# Patient Record
Sex: FEMALE | Race: WHITE | NOT HISPANIC OR LATINO | Employment: OTHER | URBAN - METROPOLITAN AREA
[De-identification: names, ages, dates, MRNs, and addresses within clinical notes are randomized per-mention and may not be internally consistent; named-entity substitution may affect disease eponyms.]

---

## 2017-10-05 ENCOUNTER — HOSPITAL ENCOUNTER (OUTPATIENT)
Facility: HOSPITAL | Age: 67
Setting detail: OBSERVATION
Discharge: NURSING, REHAB OR LONG-TERM CARE | End: 2017-10-06
Attending: INTERNAL MEDICINE | Admitting: INTERNAL MEDICINE

## 2017-10-05 ENCOUNTER — APPOINTMENT (OUTPATIENT)
Dept: GENERAL RADIOLOGY | Facility: HOSPITAL | Age: 67
End: 2017-10-05
Attending: INTERNAL MEDICINE

## 2017-10-05 ENCOUNTER — HOSPITAL ENCOUNTER (OUTPATIENT)
Facility: HOSPITAL | Age: 67
Setting detail: HOSPITAL OUTPATIENT SURGERY
End: 2017-10-05
Attending: INTERNAL MEDICINE | Admitting: INTERNAL MEDICINE

## 2017-10-05 PROBLEM — I50.9 CHF (CONGESTIVE HEART FAILURE): Status: ACTIVE | Noted: 2017-10-05

## 2017-10-05 LAB
ALBUMIN SERPL-MCNC: 3.7 G/DL (ref 3.5–5.2)
ALBUMIN/GLOB SERPL: 1.2 G/DL
ALP SERPL-CCNC: 109 U/L (ref 39–117)
ALT SERPL W P-5'-P-CCNC: 37 U/L (ref 1–33)
ANION GAP SERPL CALCULATED.3IONS-SCNC: 14.4 MMOL/L
AST SERPL-CCNC: 23 U/L (ref 1–32)
BASOPHILS # BLD AUTO: 0.03 10*3/MM3 (ref 0–0.2)
BASOPHILS NFR BLD AUTO: 0.4 % (ref 0–1.5)
BILIRUB SERPL-MCNC: 0.6 MG/DL (ref 0.1–1.2)
BUN BLD-MCNC: 33 MG/DL (ref 8–23)
BUN/CREAT SERPL: 26.6 (ref 7–25)
CALCIUM SPEC-SCNC: 9.6 MG/DL (ref 8.6–10.5)
CHLORIDE SERPL-SCNC: 96 MMOL/L (ref 98–107)
CO2 SERPL-SCNC: 28.6 MMOL/L (ref 22–29)
CREAT BLD-MCNC: 1.24 MG/DL (ref 0.57–1)
DEPRECATED RDW RBC AUTO: 50.4 FL (ref 37–54)
EOSINOPHIL # BLD AUTO: 0.09 10*3/MM3 (ref 0–0.7)
EOSINOPHIL NFR BLD AUTO: 1.2 % (ref 0.3–6.2)
ERYTHROCYTE [DISTWIDTH] IN BLOOD BY AUTOMATED COUNT: 17.5 % (ref 11.7–13)
GFR SERPL CREATININE-BSD FRML MDRD: 43 ML/MIN/1.73
GLOBULIN UR ELPH-MCNC: 3.1 GM/DL
GLUCOSE BLD-MCNC: 107 MG/DL (ref 65–99)
HCT VFR BLD AUTO: 35.7 % (ref 35.6–45.5)
HGB BLD-MCNC: 10.8 G/DL (ref 11.9–15.5)
IMM GRANULOCYTES # BLD: 0 10*3/MM3 (ref 0–0.03)
IMM GRANULOCYTES NFR BLD: 0 % (ref 0–0.5)
INR PPP: 1.42 (ref 0.9–1.1)
LYMPHOCYTES # BLD AUTO: 1.15 10*3/MM3 (ref 0.9–4.8)
LYMPHOCYTES NFR BLD AUTO: 15.9 % (ref 19.6–45.3)
MAGNESIUM SERPL-MCNC: 1.7 MG/DL (ref 1.6–2.4)
MCH RBC QN AUTO: 24.2 PG (ref 26.9–32)
MCHC RBC AUTO-ENTMCNC: 30.3 G/DL (ref 32.4–36.3)
MCV RBC AUTO: 80 FL (ref 80.5–98.2)
MONOCYTES # BLD AUTO: 0.72 10*3/MM3 (ref 0.2–1.2)
MONOCYTES NFR BLD AUTO: 10 % (ref 5–12)
NEUTROPHILS # BLD AUTO: 5.23 10*3/MM3 (ref 1.9–8.1)
NEUTROPHILS NFR BLD AUTO: 72.5 % (ref 42.7–76)
PLATELET # BLD AUTO: 305 10*3/MM3 (ref 140–500)
PMV BLD AUTO: 12 FL (ref 6–12)
POTASSIUM BLD-SCNC: 4.9 MMOL/L (ref 3.5–5.2)
PROT SERPL-MCNC: 6.8 G/DL (ref 6–8.5)
PROTHROMBIN TIME: 16.9 SECONDS (ref 11.7–14.2)
RBC # BLD AUTO: 4.46 10*6/MM3 (ref 3.9–5.2)
SODIUM BLD-SCNC: 139 MMOL/L (ref 136–145)
WBC NRBC COR # BLD: 7.22 10*3/MM3 (ref 4.5–10.7)

## 2017-10-05 PROCEDURE — 25010000002 DIPHENHYDRAMINE PER 50 MG: Performed by: INTERNAL MEDICINE

## 2017-10-05 PROCEDURE — C1900 LEAD, CORONARY VENOUS: HCPCS | Performed by: INTERNAL MEDICINE

## 2017-10-05 PROCEDURE — C1900 LEAD, CORONARY VENOUS: HCPCS

## 2017-10-05 PROCEDURE — C1769 GUIDE WIRE: HCPCS | Performed by: INTERNAL MEDICINE

## 2017-10-05 PROCEDURE — C1730 CATH, EP, 19 OR FEW ELECT: HCPCS | Performed by: INTERNAL MEDICINE

## 2017-10-05 PROCEDURE — 71010 HC CHEST PA OR AP: CPT

## 2017-10-05 PROCEDURE — C1894 INTRO/SHEATH, NON-LASER: HCPCS | Performed by: INTERNAL MEDICINE

## 2017-10-05 PROCEDURE — 93005 ELECTROCARDIOGRAM TRACING: CPT | Performed by: INTERNAL MEDICINE

## 2017-10-05 PROCEDURE — 0 IOPAMIDOL PER 1 ML: Performed by: INTERNAL MEDICINE

## 2017-10-05 PROCEDURE — C1892 INTRO/SHEATH,FIXED,PEEL-AWAY: HCPCS | Performed by: INTERNAL MEDICINE

## 2017-10-05 PROCEDURE — 83735 ASSAY OF MAGNESIUM: CPT | Performed by: INTERNAL MEDICINE

## 2017-10-05 PROCEDURE — 99153 MOD SED SAME PHYS/QHP EA: CPT | Performed by: INTERNAL MEDICINE

## 2017-10-05 PROCEDURE — 25010000002 METHYLPREDNISOLONE PER 125 MG: Performed by: INTERNAL MEDICINE

## 2017-10-05 PROCEDURE — C1882 AICD, OTHER THAN SING/DUAL: HCPCS | Performed by: INTERNAL MEDICINE

## 2017-10-05 PROCEDURE — 33249 INSJ/RPLCMT DEFIB W/LEAD(S): CPT | Performed by: INTERNAL MEDICINE

## 2017-10-05 PROCEDURE — C1882 AICD, OTHER THAN SING/DUAL: HCPCS

## 2017-10-05 PROCEDURE — 80053 COMPREHEN METABOLIC PANEL: CPT | Performed by: INTERNAL MEDICINE

## 2017-10-05 PROCEDURE — C1895 LEAD, AICD, ENDO DUAL COIL: HCPCS | Performed by: INTERNAL MEDICINE

## 2017-10-05 PROCEDURE — G0378 HOSPITAL OBSERVATION PER HR: HCPCS

## 2017-10-05 PROCEDURE — 25010000002 FENTANYL CITRATE (PF) 100 MCG/2ML SOLUTION: Performed by: INTERNAL MEDICINE

## 2017-10-05 PROCEDURE — C1898 LEAD, PMKR, OTHER THAN TRANS: HCPCS | Performed by: INTERNAL MEDICINE

## 2017-10-05 PROCEDURE — 99152 MOD SED SAME PHYS/QHP 5/>YRS: CPT | Performed by: INTERNAL MEDICINE

## 2017-10-05 PROCEDURE — 25010000002 VANCOMYCIN PER 500 MG: Performed by: INTERNAL MEDICINE

## 2017-10-05 PROCEDURE — 85610 PROTHROMBIN TIME: CPT | Performed by: INTERNAL MEDICINE

## 2017-10-05 PROCEDURE — 33225 L VENTRIC PACING LEAD ADD-ON: CPT | Performed by: INTERNAL MEDICINE

## 2017-10-05 PROCEDURE — C1898 LEAD, PMKR, OTHER THAN TRANS: HCPCS

## 2017-10-05 PROCEDURE — 25010000002 MIDAZOLAM PER 1 MG: Performed by: INTERNAL MEDICINE

## 2017-10-05 PROCEDURE — 85025 COMPLETE CBC W/AUTO DIFF WBC: CPT | Performed by: INTERNAL MEDICINE

## 2017-10-05 DEVICE — LD QUARTET LV S/CRV BIPOL 1458Q/86: Type: IMPLANTABLE DEVICE | Status: FUNCTIONAL

## 2017-10-05 DEVICE — LD PM MRI TENDRIL LPA1200M52: Type: IMPLANTABLE DEVICE | Status: FUNCTIONAL

## 2017-10-05 DEVICE — LD DEFIB OPTISURE DF4 1COIL 8F 65CM: Type: IMPLANTABLE DEVICE | Status: FUNCTIONAL

## 2017-10-05 DEVICE — IMPLANTABLE DEVICE: Type: IMPLANTABLE DEVICE | Status: FUNCTIONAL

## 2017-10-05 RX ORDER — VANCOMYCIN HYDROCHLORIDE 1 G/200ML
INJECTION, SOLUTION INTRAVENOUS CONTINUOUS PRN
Status: DISCONTINUED | OUTPATIENT
Start: 2017-10-05 | End: 2017-10-05 | Stop reason: HOSPADM

## 2017-10-05 RX ORDER — LIDOCAINE HYDROCHLORIDE 10 MG/ML
INJECTION, SOLUTION INFILTRATION; PERINEURAL AS NEEDED
Status: DISCONTINUED | OUTPATIENT
Start: 2017-10-05 | End: 2017-10-05 | Stop reason: HOSPADM

## 2017-10-05 RX ORDER — VANCOMYCIN HYDROCHLORIDE 1 G/200ML
1000 INJECTION, SOLUTION INTRAVENOUS ONCE
Status: COMPLETED | OUTPATIENT
Start: 2017-10-06 | End: 2017-10-06

## 2017-10-05 RX ORDER — METHYLPREDNISOLONE SODIUM SUCCINATE 125 MG/2ML
INJECTION, POWDER, LYOPHILIZED, FOR SOLUTION INTRAMUSCULAR; INTRAVENOUS AS NEEDED
Status: DISCONTINUED | OUTPATIENT
Start: 2017-10-05 | End: 2017-10-05 | Stop reason: HOSPADM

## 2017-10-05 RX ORDER — SODIUM CHLORIDE 9 MG/ML
INJECTION, SOLUTION INTRAVENOUS CONTINUOUS PRN
Status: DISCONTINUED | OUTPATIENT
Start: 2017-10-05 | End: 2017-10-05 | Stop reason: HOSPADM

## 2017-10-05 RX ORDER — FLUOXETINE HYDROCHLORIDE 20 MG/1
40 CAPSULE ORAL DAILY
COMMUNITY

## 2017-10-05 RX ORDER — LEVOTHYROXINE SODIUM 0.15 MG/1
150 TABLET ORAL DAILY
Status: DISCONTINUED | OUTPATIENT
Start: 2017-10-06 | End: 2017-10-05 | Stop reason: SDUPTHER

## 2017-10-05 RX ORDER — FAMOTIDINE 20 MG/1
20 TABLET, FILM COATED ORAL 2 TIMES DAILY
Status: DISCONTINUED | OUTPATIENT
Start: 2017-10-05 | End: 2017-10-06 | Stop reason: HOSPADM

## 2017-10-05 RX ORDER — OMEPRAZOLE 20 MG/1
20 CAPSULE, DELAYED RELEASE ORAL DAILY
COMMUNITY
End: 2018-07-04 | Stop reason: HOSPADM

## 2017-10-05 RX ORDER — FLUOXETINE HYDROCHLORIDE 20 MG/1
40 CAPSULE ORAL DAILY
Status: DISCONTINUED | OUTPATIENT
Start: 2017-10-05 | End: 2017-10-06 | Stop reason: HOSPADM

## 2017-10-05 RX ORDER — TORSEMIDE 20 MG/1
40 TABLET ORAL DAILY
Status: DISCONTINUED | OUTPATIENT
Start: 2017-10-05 | End: 2017-10-06 | Stop reason: HOSPADM

## 2017-10-05 RX ORDER — HYDROCODONE BITARTRATE AND ACETAMINOPHEN 5; 325 MG/1; MG/1
1 TABLET ORAL EVERY 4 HOURS PRN
Status: DISCONTINUED | OUTPATIENT
Start: 2017-10-05 | End: 2017-10-09 | Stop reason: HOSPADM

## 2017-10-05 RX ORDER — CETIRIZINE HYDROCHLORIDE 10 MG/1
10 TABLET ORAL DAILY
COMMUNITY

## 2017-10-05 RX ORDER — LIOTHYRONINE SODIUM 25 UG/1
25 TABLET ORAL DAILY
Status: DISCONTINUED | OUTPATIENT
Start: 2017-10-05 | End: 2017-10-06 | Stop reason: HOSPADM

## 2017-10-05 RX ORDER — POTASSIUM CHLORIDE 750 MG/1
20 CAPSULE, EXTENDED RELEASE ORAL 2 TIMES DAILY
COMMUNITY

## 2017-10-05 RX ORDER — CETIRIZINE HYDROCHLORIDE 10 MG/1
10 TABLET ORAL DAILY
Status: DISCONTINUED | OUTPATIENT
Start: 2017-10-06 | End: 2017-10-06

## 2017-10-05 RX ORDER — FENTANYL CITRATE 50 UG/ML
INJECTION, SOLUTION INTRAMUSCULAR; INTRAVENOUS AS NEEDED
Status: DISCONTINUED | OUTPATIENT
Start: 2017-10-05 | End: 2017-10-05 | Stop reason: HOSPADM

## 2017-10-05 RX ORDER — CETIRIZINE HYDROCHLORIDE 10 MG/1
10 TABLET ORAL DAILY
Status: DISCONTINUED | OUTPATIENT
Start: 2017-10-06 | End: 2017-10-06 | Stop reason: HOSPADM

## 2017-10-05 RX ORDER — DOXEPIN HYDROCHLORIDE 25 MG/1
50 CAPSULE ORAL NIGHTLY PRN
COMMUNITY

## 2017-10-05 RX ORDER — NITROGLYCERIN 0.4 MG/1
0.4 TABLET SUBLINGUAL
COMMUNITY

## 2017-10-05 RX ORDER — ZOLPIDEM TARTRATE 5 MG/1
5 TABLET ORAL NIGHTLY PRN
Status: DISCONTINUED | OUTPATIENT
Start: 2017-10-05 | End: 2017-10-06 | Stop reason: HOSPADM

## 2017-10-05 RX ORDER — ACETAMINOPHEN 325 MG/1
650 TABLET ORAL EVERY 4 HOURS PRN
Status: DISCONTINUED | OUTPATIENT
Start: 2017-10-05 | End: 2017-10-09 | Stop reason: HOSPADM

## 2017-10-05 RX ORDER — ASPIRIN 325 MG
325 TABLET ORAL DAILY
Status: DISCONTINUED | OUTPATIENT
Start: 2017-10-06 | End: 2017-10-05 | Stop reason: SDUPTHER

## 2017-10-05 RX ORDER — TORSEMIDE 20 MG/1
40 TABLET ORAL DAILY
Status: ON HOLD | COMMUNITY
End: 2018-07-04

## 2017-10-05 RX ORDER — SODIUM CHLORIDE 0.9 % (FLUSH) 0.9 %
1-10 SYRINGE (ML) INJECTION AS NEEDED
Status: DISCONTINUED | OUTPATIENT
Start: 2017-10-05 | End: 2017-10-06 | Stop reason: HOSPADM

## 2017-10-05 RX ORDER — TORSEMIDE 20 MG/1
40 TABLET ORAL DAILY
Status: DISCONTINUED | OUTPATIENT
Start: 2017-10-06 | End: 2017-10-05 | Stop reason: SDUPTHER

## 2017-10-05 RX ORDER — MIDAZOLAM HYDROCHLORIDE 1 MG/ML
INJECTION INTRAMUSCULAR; INTRAVENOUS AS NEEDED
Status: DISCONTINUED | OUTPATIENT
Start: 2017-10-05 | End: 2017-10-05 | Stop reason: HOSPADM

## 2017-10-05 RX ORDER — FLUOXETINE HYDROCHLORIDE 20 MG/1
40 CAPSULE ORAL DAILY
Status: DISCONTINUED | OUTPATIENT
Start: 2017-10-06 | End: 2017-10-05 | Stop reason: SDUPTHER

## 2017-10-05 RX ORDER — LEVOTHYROXINE SODIUM 0.15 MG/1
150 TABLET ORAL
Status: DISCONTINUED | OUTPATIENT
Start: 2017-10-06 | End: 2017-10-06 | Stop reason: HOSPADM

## 2017-10-05 RX ORDER — DIPHENHYDRAMINE HYDROCHLORIDE 50 MG/ML
INJECTION INTRAMUSCULAR; INTRAVENOUS AS NEEDED
Status: DISCONTINUED | OUTPATIENT
Start: 2017-10-05 | End: 2017-10-05 | Stop reason: HOSPADM

## 2017-10-05 RX ORDER — ONDANSETRON 2 MG/ML
4 INJECTION INTRAMUSCULAR; INTRAVENOUS EVERY 6 HOURS PRN
Status: DISCONTINUED | OUTPATIENT
Start: 2017-10-05 | End: 2017-10-06 | Stop reason: HOSPADM

## 2017-10-05 RX ORDER — LIOTHYRONINE SODIUM 25 UG/1
25 TABLET ORAL DAILY
COMMUNITY
End: 2022-07-19

## 2017-10-05 RX ORDER — LEVOTHYROXINE SODIUM 0.15 MG/1
150 TABLET ORAL DAILY
COMMUNITY

## 2017-10-05 RX ORDER — OMEPRAZOLE 20 MG/1
20 CAPSULE, DELAYED RELEASE ORAL
Status: DISCONTINUED | OUTPATIENT
Start: 2017-10-06 | End: 2017-10-06 | Stop reason: HOSPADM

## 2017-10-05 RX ORDER — ASPIRIN 325 MG
325 TABLET ORAL DAILY
COMMUNITY

## 2017-10-05 RX ORDER — DOXEPIN HYDROCHLORIDE 50 MG/1
50 CAPSULE ORAL NIGHTLY PRN
Status: DISCONTINUED | OUTPATIENT
Start: 2017-10-05 | End: 2017-10-06 | Stop reason: HOSPADM

## 2017-10-05 RX ORDER — POTASSIUM CHLORIDE 750 MG/1
20 CAPSULE, EXTENDED RELEASE ORAL 2 TIMES DAILY WITH MEALS
Status: DISCONTINUED | OUTPATIENT
Start: 2017-10-05 | End: 2017-10-06 | Stop reason: HOSPADM

## 2017-10-05 RX ORDER — PANTOPRAZOLE SODIUM 40 MG/1
40 TABLET, DELAYED RELEASE ORAL EVERY MORNING
Status: DISCONTINUED | OUTPATIENT
Start: 2017-10-06 | End: 2017-10-06 | Stop reason: HOSPADM

## 2017-10-05 RX ORDER — ASPIRIN 325 MG
325 TABLET ORAL DAILY
Status: DISCONTINUED | OUTPATIENT
Start: 2017-10-05 | End: 2017-10-06 | Stop reason: HOSPADM

## 2017-10-05 RX ORDER — NITROGLYCERIN 0.4 MG/1
0.4 TABLET SUBLINGUAL
Status: DISCONTINUED | OUTPATIENT
Start: 2017-10-05 | End: 2017-10-06 | Stop reason: HOSPADM

## 2017-10-05 RX ORDER — DOXEPIN HYDROCHLORIDE 50 MG/1
50 CAPSULE ORAL NIGHTLY PRN
Status: DISCONTINUED | OUTPATIENT
Start: 2017-10-05 | End: 2017-10-05 | Stop reason: SDUPTHER

## 2017-10-05 RX ORDER — LIOTHYRONINE SODIUM 25 UG/1
25 TABLET ORAL DAILY
Status: DISCONTINUED | OUTPATIENT
Start: 2017-10-06 | End: 2017-10-05 | Stop reason: SDUPTHER

## 2017-10-05 RX ORDER — ONDANSETRON 2 MG/ML
4 INJECTION INTRAMUSCULAR; INTRAVENOUS EVERY 6 HOURS PRN
Status: DISCONTINUED | OUTPATIENT
Start: 2017-10-05 | End: 2017-10-09 | Stop reason: HOSPADM

## 2017-10-05 RX ORDER — POTASSIUM CHLORIDE 750 MG/1
20 CAPSULE, EXTENDED RELEASE ORAL 2 TIMES DAILY
Status: DISCONTINUED | OUTPATIENT
Start: 2017-10-05 | End: 2017-10-05 | Stop reason: SDUPTHER

## 2017-10-05 NOTE — PLAN OF CARE
Problem: Patient Care Overview (Adult)  Goal: Plan of Care Review  Outcome: Ongoing (interventions implemented as appropriate)    10/05/17 1641   Coping/Psychosocial Response Interventions   Plan Of Care Reviewed With patient   Patient Care Overview   Progress no change   Outcome Evaluation   Outcome Summary/Follow up Plan Patient to have ICD implanted, denies complaints. Plan to have this done tonight. Will continue to monitor.        Goal: Adult Individualization and Mutuality  Outcome: Ongoing (interventions implemented as appropriate)  Goal: Discharge Needs Assessment  Outcome: Ongoing (interventions implemented as appropriate)    Problem: Cardiac: Heart Failure (Adult)  Goal: Signs and Symptoms of Listed Potential Problems Will be Absent or Manageable (Cardiac: Heart Failure)  Outcome: Ongoing (interventions implemented as appropriate)

## 2017-10-05 NOTE — H&P
Patient Care Team:  No Known Provider as PCP - General    Electrophysiology Admission Note    Chief complaint:  SOB, Palpitations - NIDCM, NSVT    Subjective     Temi Ho is a very pleasant  67 y.o. female whom I had originally encounted at [facility] secondary to ADHF as well as NSVT. She has a longstanding history of NIDCM - diagnosed two years ago - a cardiac catherization showed no obstructive disease. She was started on medical management. Despite OPT, continued to have severely diminished LV Function. An ECHO from Spring of 2017 showed an EF of 14%. At that time, an ICD was discussed with her - she refused it at the time as her  had just passed, and she was upset. More recently, she had worsening SOB/DUNAWAY - barely able to do her ADLs presented to [facility] in [facility] - diuresed. Symptoms improved. She had episodes of NSVT. An ECG demonstrated SR with LBBB with a QRS width of > 150 ms. We discussed various options including consideration of a BiV/IVD which she wanted to proceed with. She was transferred to [facility] for implantation of a Device.     Review of Systems   Negative except as stated above.    History  Past Medical History:   Diagnosis Date   • Arthritis    • Atrial fibrillation    • BBB (bundle branch block)    • CHF (congestive heart failure)    • COPD (chronic obstructive pulmonary disease)    • Diabetes mellitus    • Disease of thyroid gland    • GERD (gastroesophageal reflux disease)    • Hyperlipidemia    • Hypertension    • IBS (irritable bowel syndrome)    • Obesity      Past Surgical History:   Procedure Laterality Date   • BILATERAL OOPHORECTOMY     • HYSTERECTOMY     • JOINT REPLACEMENT      bilateral knees     Family History   Problem Relation Age of Onset   • Heart disease Mother    • Cancer Mother    • Hypertension Mother    • Heart disease Father    • Hypertension Father    • Aortic aneurysm Sister      Social History   Substance Use Topics   • Smoking  status: Former Smoker     Packs/day: 2.50     Years: 45.00     Types: Cigarettes     Start date: 1962     Quit date: 2007   • Smokeless tobacco: Never Used   • Alcohol use Yes      Comment: rarely     Prescriptions Prior to Admission   Medication Sig Dispense Refill Last Dose   • aspirin 325 MG tablet Take 325 mg by mouth Daily.   10/4/2017 at Unknown time   • cetirizine (zyrTEC) 10 MG tablet Take 10 mg by mouth Daily.   10/4/2017 at Unknown time   • doxepin (SINEquan) 25 MG capsule Take 50 mg by mouth At Night As Needed for Sleep.   Past Week at Unknown time   • FLUoxetine (PROzac) 20 MG capsule Take 40 mg by mouth Daily.   10/4/2017 at Unknown time   • levothyroxine (SYNTHROID, LEVOTHROID) 150 MCG tablet Take 150 mcg by mouth Daily.   10/4/2017 at Unknown time   • liothyronine (CYTOMEL) 25 MCG tablet Take 25 mcg by mouth Daily.   10/4/2017 at Unknown time   • nitroglycerin (NITROSTAT) 0.4 MG SL tablet Place 0.4 mg under the tongue Every 5 (Five) Minutes As Needed for Chest Pain. Take no more than 3 doses in 15 minutes.   Past Month at Unknown time   • omeprazole (priLOSEC) 20 MG capsule Take 20 mg by mouth Daily.   10/4/2017 at Unknown time   • potassium chloride (MICRO-K) 10 MEQ CR capsule Take 20 mEq by mouth 2 (Two) Times a Day.   10/4/2017 at Unknown time   • torsemide (DEMADEX) 20 MG tablet Take 40 mg by mouth Daily.   Past Week at Unknown time     Allergies:  Ace inhibitors; Coreg [carvedilol]; Iodine; Metoprolol; Spironolactone; Statins; Aleve [naproxen sodium]; Amoxicillin; Latex; and Tylenol [acetaminophen]  Social History     Social History   • Marital status:      Spouse name: N/A   • Number of children: N/A   • Years of education: N/A     Occupational History   • Not on file.     Social History Main Topics   • Smoking status: Former Smoker     Packs/day: 2.50     Years: 45.00     Types: Cigarettes     Start date: 1962     Quit date: 2007   • Smokeless tobacco: Never Used   • Alcohol use Yes       Comment: rarely   • Drug use: No      Comment: used cannabis oil as bronchodilator for a few days a couple weeks ago, but nothing since.   • Sexual activity: Defer     Other Topics Concern   • Not on file     Social History Narrative   • No narrative on file      Family History   Problem Relation Age of Onset   • Heart disease Mother    • Cancer Mother    • Hypertension Mother    • Heart disease Father    • Hypertension Father    • Aortic aneurysm Sister         Objective     Vital Signs  Temp:  [97.6 °F (36.4 °C)-98.1 °F (36.7 °C)] 98.1 °F (36.7 °C)  Heart Rate:  [] 89  Resp:  [16-18] 16  BP: (101-107)/(75-77) 107/75    Physical Exam:      General Appearance:    Alert, cooperative, in no acute distress   Head:    Normocephalic, without obvious abnormality, atraumatic   Eyes:            Lids and lashes normal, conjunctivae and sclerae normal, no   icterus, no pallor, corneas clear, PERRLA   Ears:    Ears appear intact with no abnormalities noted   Throat:   No oral lesions, no thrush, oral mucosa moist   Neck:   No adenopathy, supple, trachea midline, no thyromegaly, no     carotid bruit, no JVD   Back:     No kyphosis present, no scoliosis present, no skin lesions,       erythema or scars, no tenderness to percussion or                   palpation,   range of motion normal   Lungs:     Clear to auscultation,respirations regular, even and                   unlabored    Heart:    Regular rhythm and normal rate, normal S1 and S2, no            murmur, no gallop, no rub, no click   Breast Exam:    Deferred   Abdomen:     Normal bowel sounds, no masses, no organomegaly, soft        non-tender, non-distended, no guarding, no rebound                 tenderness   Genitalia:    Deferred   Extremities:   Moves all extremities well, no edema, no cyanosis, no              redness   Pulses:   Pulses palpable and equal bilaterally   Skin:   No bleeding, bruising or rash   Lymph nodes:   No palpable adenopathy   Neurologic:    Cranial nerves 2 - 12 grossly intact, sensation intact, DTR        present and equal bilaterally       Results Review:    I reviewed the patient's new clinical results.  I reviewed the patient's new imaging results and agree with the interpretation.  I personally viewed and interpreted the patient's EKG/Telemetry data  Results from last 7 days  Lab Units 10/05/17  1205   POTASSIUM mmol/L 4.9   CREATININE mg/dL 1.24*   BILIRUBIN mg/dL 0.6   MAGNESIUM mg/dL 1.7         Results from last 7 days  Lab Units 10/05/17  1205   WBC 10*3/mm3 7.22   HEMOGLOBIN g/dL 10.8*   HEMATOCRIT % 35.7   PLATELETS 10*3/mm3 305     No results found for: TROPONINT  No results found for: CHOL  No results found for: HDL  No results found for: LDL  No results found for: TRIG  No components found for: CHOLHDL  No results found for: TSH  Lab Results   Component Value Date    INR 1.42 (H) 10/05/2017        Echo EF Estimated  No results found for: ECHOEFEST      Assessment/Plan     Active Problems:    * No active hospital problems. *      A/P: 66 yo female with longstanding NIDCM with NSVT.    1. NIDCM - HFrEF - longstanding > 9 mos - despite OPT - continues to have severely depressed LV Function with NYHA Class III symptoms - meets Class I indications for ICD for primary prophylaxis based on SCD-HeFT criteria. Also has LBBB with wide QRS width > 150 ms - meets Class I indications for CRTD device. Plan on implantation of BiV/ICD today. Intolerant of BB and ACE/ARB - very limited on medical management. On diuretics - euvolemic currently.    Informed Consent Obtained.    2. HTN - normotensive on current regimen.    3. DM - hold metformin with planned surgery - SSI.    4. GERD - PPI.    I discussed the patients findings and my recommendations with patient and nursing staff.     Yash Watts MD  10/05/17  5:58 PM    Time: > 75 minutes    EMR Dragon/Transcription disclaimer:   Much of this encounter note is an electronic  transcription/translation of spoken language to printed text. The electronic translation of spoken language may permit erroneous, or at times, nonsensical words or phrases to be inadvertently transcribed.  Although I have reviewed the note for such errors, some may still exist. Please contact me if needed for clarification or correction.

## 2017-10-06 ENCOUNTER — APPOINTMENT (OUTPATIENT)
Dept: GENERAL RADIOLOGY | Facility: HOSPITAL | Age: 67
End: 2017-10-06
Attending: INTERNAL MEDICINE

## 2017-10-06 VITALS
DIASTOLIC BLOOD PRESSURE: 66 MMHG | OXYGEN SATURATION: 98 % | SYSTOLIC BLOOD PRESSURE: 102 MMHG | HEART RATE: 108 BPM | RESPIRATION RATE: 16 BRPM | TEMPERATURE: 97.5 F | BODY MASS INDEX: 38.55 KG/M2 | HEIGHT: 61 IN | WEIGHT: 204.2 LBS

## 2017-10-06 PROBLEM — I42.8 NONISCHEMIC CARDIOMYOPATHY (HCC): Chronic | Status: ACTIVE | Noted: 2017-10-06

## 2017-10-06 LAB
ANION GAP SERPL CALCULATED.3IONS-SCNC: 13.3 MMOL/L
BUN BLD-MCNC: 35 MG/DL (ref 8–23)
BUN/CREAT SERPL: 25.9 (ref 7–25)
CALCIUM SPEC-SCNC: 9.2 MG/DL (ref 8.6–10.5)
CHLORIDE SERPL-SCNC: 97 MMOL/L (ref 98–107)
CO2 SERPL-SCNC: 28.7 MMOL/L (ref 22–29)
CREAT BLD-MCNC: 1.35 MG/DL (ref 0.57–1)
DEPRECATED RDW RBC AUTO: 49.9 FL (ref 37–54)
ERYTHROCYTE [DISTWIDTH] IN BLOOD BY AUTOMATED COUNT: 17.3 % (ref 11.7–13)
GFR SERPL CREATININE-BSD FRML MDRD: 39 ML/MIN/1.73
GLUCOSE BLD-MCNC: 208 MG/DL (ref 65–99)
HCT VFR BLD AUTO: 34.5 % (ref 35.6–45.5)
HGB BLD-MCNC: 10.2 G/DL (ref 11.9–15.5)
INR PPP: 1.36 (ref 0.9–1.1)
MCH RBC QN AUTO: 23.7 PG (ref 26.9–32)
MCHC RBC AUTO-ENTMCNC: 29.6 G/DL (ref 32.4–36.3)
MCV RBC AUTO: 80 FL (ref 80.5–98.2)
PLATELET # BLD AUTO: 257 10*3/MM3 (ref 140–500)
PMV BLD AUTO: 11.7 FL (ref 6–12)
POTASSIUM BLD-SCNC: 4.1 MMOL/L (ref 3.5–5.2)
PROTHROMBIN TIME: 16.3 SECONDS (ref 11.7–14.2)
RBC # BLD AUTO: 4.31 10*6/MM3 (ref 3.9–5.2)
SODIUM BLD-SCNC: 139 MMOL/L (ref 136–145)
WBC NRBC COR # BLD: 6.02 10*3/MM3 (ref 4.5–10.7)

## 2017-10-06 PROCEDURE — 93005 ELECTROCARDIOGRAM TRACING: CPT | Performed by: INTERNAL MEDICINE

## 2017-10-06 PROCEDURE — 93010 ELECTROCARDIOGRAM REPORT: CPT | Performed by: INTERNAL MEDICINE

## 2017-10-06 PROCEDURE — G0008 ADMIN INFLUENZA VIRUS VAC: HCPCS | Performed by: INTERNAL MEDICINE

## 2017-10-06 PROCEDURE — 85027 COMPLETE CBC AUTOMATED: CPT | Performed by: INTERNAL MEDICINE

## 2017-10-06 PROCEDURE — G0378 HOSPITAL OBSERVATION PER HR: HCPCS

## 2017-10-06 PROCEDURE — 90661 CCIIV3 VAC ABX FR 0.5 ML IM: CPT | Performed by: INTERNAL MEDICINE

## 2017-10-06 PROCEDURE — 25010000002 VANCOMYCIN PER 500 MG: Performed by: INTERNAL MEDICINE

## 2017-10-06 PROCEDURE — 25010000002 INFLUENZA VAC SUBUNIT QUAD 0.5 ML SUSPENSION PREFILLED SYRINGE: Performed by: INTERNAL MEDICINE

## 2017-10-06 PROCEDURE — 85610 PROTHROMBIN TIME: CPT | Performed by: INTERNAL MEDICINE

## 2017-10-06 PROCEDURE — 71020 HC CHEST PA AND LATERAL: CPT

## 2017-10-06 PROCEDURE — 80048 BASIC METABOLIC PNL TOTAL CA: CPT | Performed by: INTERNAL MEDICINE

## 2017-10-06 RX ORDER — LEVOFLOXACIN 750 MG/1
750 TABLET ORAL DAILY
Qty: 20 TABLET | Refills: 0
Start: 2017-10-06 | End: 2017-10-13

## 2017-10-06 RX ADMIN — PANTOPRAZOLE SODIUM 40 MG: 40 TABLET, DELAYED RELEASE ORAL at 09:11

## 2017-10-06 RX ADMIN — ASPIRIN 325 MG: 325 TABLET ORAL at 09:11

## 2017-10-06 RX ADMIN — LIOTHYRONINE SODIUM 25 MCG: 25 TABLET ORAL at 09:11

## 2017-10-06 RX ADMIN — CETIRIZINE HYDROCHLORIDE 10 MG: 10 TABLET, FILM COATED ORAL at 09:09

## 2017-10-06 RX ADMIN — POTASSIUM CHLORIDE 20 MEQ: 750 CAPSULE, EXTENDED RELEASE ORAL at 09:11

## 2017-10-06 RX ADMIN — FAMOTIDINE 20 MG: 20 TABLET, FILM COATED ORAL at 09:09

## 2017-10-06 RX ADMIN — VANCOMYCIN HYDROCHLORIDE 1000 MG: 1 INJECTION, SOLUTION INTRAVENOUS at 06:45

## 2017-10-06 RX ADMIN — A/SINGAPORE/GP1908/2015 IVR-180 (H1N1) (AN A/MICHIGAN/45/2015-LIKE VIRUS), A/SINGAPORE/GP2050/2015 (H3N2) (AN A/HONG KONG/4801/2014 - LIKE VIRUS), B/UTAH/9/2014 (A B/PHUKET/3073/2013-LIKE VIRUS), B/HONG KONG/259/2010 (A B/BRISBANE/60/08-LIKE VIRUS) 0.5 ML: 15; 15; 15; 15 INJECTION, SUSPENSION INTRAMUSCULAR at 11:03

## 2017-10-06 RX ADMIN — TORSEMIDE 40 MG: 20 TABLET ORAL at 09:09

## 2017-10-06 RX ADMIN — LEVOTHYROXINE SODIUM 150 MCG: 150 TABLET ORAL at 07:09

## 2017-10-06 RX ADMIN — FLUOXETINE HYDROCHLORIDE 40 MG: 20 CAPSULE ORAL at 09:11

## 2017-10-06 NOTE — DISCHARGE SUMMARY
Date of Discharge:  10/6/2017    Discharge Diagnosis: Severe ischemic cardiomyopathy, intolerance to usual medical therapy for the cardiomyopathy    Presenting Problem/History of Present Illness  CHF (congestive heart failure) [I50.9]  CHF (congestive heart failure) [I50.9]     Patient Active Problem List   Diagnosis   • CHF (congestive heart failure)            Hospital Course  Patient is a 67 y.o. female presented with a severe nonischemic cardiomyopathy.  She was in [facility] over the weekend.  She was admitted there with some  decompensated systolic/diastolic heart failure.  He has a known severe nonischemic cardiac myopathy.  The patient is allergic to beta blockers and had angioedema with Ace inhibitors.  Therefore she we cannot use any beta blocker or Ace or ARB or Entresto.  Over the weekend she was started on a minimal dose of spironolactone at 12.5 mg.  She gave a previous history of an allergy to that but that was not clear.  Someone stopped the spironolactone so I assume she may have had some difficulty with that.  The patient has a left bundle branch block with a wide QRS.  Due to the inability to prescribe any significant medical therapy for her cardiomyopathy was transferred here and yesterday had a biventricular ICD implanted I Dr. Watts.  On the day of discharge she appears euvolemic and has no shortness of air.  She says she generally feels well.  I have reviewed the chest x-ray and the pacemaker looks okay.  I don't have the official radiology report yet and if something changes on that unless the nurse to notify me.  Lungs look clear to me on the chest x-ray with no congestion and no pneumothorax.  The implantation site looks and feels good.  Will be discharged on her prior medicines and Dr. Watts wants her to have Levaquin 750 mg daily for 7 days.  She has some mild elevation of her creatinine.  When she sees him next Wednesday she should have a BMP drawn to check the  renal function and the potassium.  Her sinus heart rate remains elevated as it was in the last several days.  Heart rate now is 94.  If it is still elevated when she seen in the office next Wednesday she may be tried on low-dose Colanor.  Procedures Performed  Procedure(s):  Implant ICD - bi ventricular       Consults:   Consults     No orders found for last 30 day(s).          Pertinent Test Results: Pacemaker insertion with ICD    Ejection Fraction  Previous LVEF in the 15-20% range    Condition on Discharge:  Stable    Physical Exam at Discharge    Vital Signs  Temp:  [97.4 °F (36.3 °C)-98.2 °F (36.8 °C)] 97.4 °F (36.3 °C)  Heart Rate:  [] 100  Resp:  [15-18] 16  BP: ()/() 102/66  Wt Readings from Last 4 Encounters:   10/05/17 204 lb 3.2 oz (92.6 kg)      General Appearance:    Alert, cooperative, in no acute distress   Head:    Normocephalic, without obvious abnormality, atraumatic   Eyes:            Conjunctivae normal, no   icterus   Neck:   No adenopathy, supple, trachea midline, no thyromegaly, no   carotid bruit, no JVD   Lungs:     Clear to auscultation,respirations regular, even and                  unlabored    Heart:     Regular rhythm and normal rate, normal S1 and S2,            No murmur, no gallop, no rub, no click   Chest Wall:    No abnormalities observed   Abdomen:     Normal bowel sounds, no masses, no organomegaly, soft        non-tender, non-distended, no guarding, no rebound                tenderness   Rectal:     Deferred   Extremities:   No edema. Moves all extremities well, no cyanosis, no erythema       Skin:   No bleeding, bruising or rash   Neurologic:   Cranial nerves 2 - 12 grossly intact, sensation intact, DTR       present and equal bilaterally          Discharge DispositionShe is going to a skilled nursing unit for some rehabilitation      Discharge Medications   Temi Ho   Home Medication Instructions IVAN:[id number]    Printed on:10/06/17 0949    Medication Information                      aspirin 325 MG tablet  Take 325 mg by mouth Daily.             cetirizine (zyrTEC) 10 MG tablet  Take 10 mg by mouth Daily.             doxepin (SINEquan) 25 MG capsule  Take 50 mg by mouth At Night As Needed for Sleep.             FLUoxetine (PROzac) 20 MG capsule  Take 40 mg by mouth Daily.             levothyroxine (SYNTHROID, LEVOTHROID) 150 MCG tablet  Take 150 mcg by mouth Daily.             liothyronine (CYTOMEL) 25 MCG tablet  Take 25 mcg by mouth Daily.             nitroglycerin (NITROSTAT) 0.4 MG SL tablet  Place 0.4 mg under the tongue Every 5 (Five) Minutes As Needed for Chest Pain. Take no more than 3 doses in 15 minutes.             omeprazole (priLOSEC) 20 MG capsule  Take 20 mg by mouth Daily.             potassium chloride (MICRO-K) 10 MEQ CR capsule  Take 20 mEq by mouth 2 (Two) Times a Day.             torsemide (DEMADEX) 20 MG tablet  Take 40 mg by mouth Daily.                 Discharge Diet:  cardiac low-sodium    Activity at Discharge:  her general activity is limited but she can gradually resume whatever activity she was doing preadmission  She has been told to not lift anything with her left arm and did not raise her left arm over her shoulder.    Follow-up Appointments  See Dr. Watts in our [place] office on 10/11/17.  She needs a BMP drawn at that time.      Test Results at Discharge  Lab Results   Component Value Date    GLUCOSE 208 (H) 10/06/2017    CALCIUM 9.2 10/06/2017     10/06/2017    K 4.1 10/06/2017    CO2 28.7 10/06/2017    CL 97 (L) 10/06/2017    BUN 35 (H) 10/06/2017    CREATININE 1.35 (H) 10/06/2017    EGFRIFNONA 39 (L) 10/06/2017    BCR 25.9 (H) 10/06/2017    ANIONGAP 13.3 10/06/2017        Lab Results   Component Value Date    GLUCOSE 208 (H) 10/06/2017    BUN 35 (H) 10/06/2017    CREATININE 1.35 (H) 10/06/2017    EGFRIFNONA 39 (L) 10/06/2017    BCR 25.9 (H) 10/06/2017    CO2 28.7 10/06/2017    CALCIUM 9.2 10/06/2017     ALBUMIN 3.70 10/05/2017    LABIL2 1.2 10/05/2017    AST 23 10/05/2017    ALT 37 (H) 10/05/2017        Magnesium   Date Value Ref Range Status   10/05/2017 1.7 1.6 - 2.4 mg/dL Final       Lab Results   Component Value Date    WBC 6.02 10/06/2017    HGB 10.2 (L) 10/06/2017    HCT 34.5 (L) 10/06/2017    MCV 80.0 (L) 10/06/2017     10/06/2017      No results found for: CHOL  No results found for: HDL  No results found for: LDL  No results found for: TRIG  No components found for: CHOLHDL   No results found for: HGBA1C   No results found for: TSH        Jorge Yeboah MD  10/06/17  9:49 AM    Time: 30 minutes

## 2017-10-06 NOTE — PROGRESS NOTES
Continued Stay Note  [facility]     Patient Name: Temi Ho  MRN: [id number]  Today's Date: 10/6/2017    Admit Date: 10/5/2017          Discharge Plan       10/06/17 1209    Case Management/Social Work Plan    Additional Comments Dr. Yeboah has signed 30 day/PASSR form.  Form placed in Packet and a copy placed in HIM basket.        10/06/17 1156    Case Management/Social Work Plan    Plan [facility] in [place], [place].    Patient/Family In Agreement With Plan yes    Additional Comments Spoke with Pt at bedside.  CCP introduced self and role.  Pt confirmed information on face sheet.  Pt stated she is IADL'S, retired & drives.  Pt 44yo son James Vega lives with her in a three level home.  Pt stated she has used home health in the past however cannot remember the agency name.  Pt has been to subacute rehab in past at [facility] in [place], [place].  Pt denies issues with affording her medications.  Pt stated she is pre-registered at [facility] in [place], [place] for rehab.  CCP called Rima, liaison and she has confirmed this.  Packet has been made and given to Dulce CHRISTY RN to call report to facility.  Per [facility] request, Pt needs a 30 day/PAS signed by MD.  A call has been placed to Dr. Yeboah to obtain signature for 30 day form.  Dr. Yeboah confirmed he will come to CCP office after lunch to complete form.  CCP following.                Discharge Codes     None        Expected Discharge Date and Time     Expected Discharge Date Expected Discharge Time    Oct 6, 2017             Mayra Ambrosio RN

## 2017-10-06 NOTE — PROGRESS NOTES
Case Management Discharge Note    Final Note: Pt d/c to [facility] in [place], [place] with son Ulysses driving.    Discharge Placement     Facility/Agency Request Status Selected? Address Phone Number Fax Number    [facility]    Yes [address] [facility] [address] [phone]        Other: Other (sons car)    Discharge Codes: 03  Discharged/transferred to skilled nursing facility (SNF) with Medicare certification in anticipation of skilled care

## 2017-10-06 NOTE — PROGRESS NOTES
"Adult Nutrition  Assessment/PES    Patient Name:  Temi Ho  YOB: 1950  MRN: [id number]  Admit Date:  10/5/2017    Assessment Date:  10/6/2017    Comments:    Discharge noted for today.           Reason for Assessment       10/06/17 1025    Reason for Assessment    Reason For Assessment/Visit nurse/nurse practitioner consult    Identified At Risk By Screening Criteria MST SCORE 2+;weight status    Cardiac CHF;Cardiomyopathy;Other (comment)   here for ICD placement              Nutrition/Diet History       10/06/17 1025    Nutrition/Diet History    Appetite Poor/decreased            Anthropometrics       10/06/17 1026    Anthropometrics    RD Documented Weight on Admission 92.5 kg (204 lb)    Anthropometrics (Special Considerations)    Height Used for Calculations 1.549 m (5' 1\")    RD Calculated BMI (kg/m2) 38    Usual Body Weight (UBW)    Usual Body Weight 112 kg (247 lb)   11/2015    Body Mass Index (BMI)    BMI Grade >35 obesity - grade II            Labs/Tests/Procedures/Meds       10/06/17 1027    Labs/Tests/Procedures/Meds    Diagnostic Test/Procedure Review reviewed    Labs/Tests Review Reviewed;Cl-;Glucose;BUN;Creat;ALT;Hgb Hct    Medication Review Reviewed, pertinent;Antacid;Diuretic    Significant Vitals reviewed            Physical Findings       10/06/17 1028    Physical Findings/Assessment    Additional Documentation Physical Appearance (Group)    Physical Appearance    Overall Physical Appearance obese              Nutrition Prescription Ordered       10/06/17 1028    Nutrition Prescription PO    Common Modifiers Cardiac            Evaluation of Received Nutrient/Fluid Intake       10/06/17 1028    PO Evaluation    Number of Days PO Intake Evaluated Insufficient Data            Problem/Interventions:        Problem 1       10/06/17 1029    Nutrition Diagnoses Problem 1    Problem 1 Unintended Weight Loss    Signs/Symptoms (evidenced by) Unintended Weight Change    Unintended " Weight Change Loss    Number of Pounds Lost ~40 lb    Weight loss time period 2 yr                    Intervention Goal       10/06/17 1029    Intervention Goal    General Maintain nutrition    PO Establish PO    Weight Appropriate weight loss            Nutrition Intervention       10/06/17 1029    Nutrition Intervention    RD/Tech Action Follow Tx progress              Education/Evaluation       10/06/17 1029    Monitor/Evaluation    Monitor Per protocol        Electronically signed by:  Mei Forrester RD  10/06/17 10:29 AM

## 2017-10-06 NOTE — PROGRESS NOTES
Continued Stay Note  [facility]     Patient Name: Temi Ho  MRN: [id number]  Today's Date: 10/6/2017    Admit Date: 10/5/2017          Discharge Plan       10/06/17 1156    Case Management/Social Work Plan    Plan [facility] in [place], [place].    Patient/Family In Agreement With Plan yes    Additional Comments Spoke with Pt at bedside.  CCP introduced self and role.  Pt confirmed information on face sheet.  Pt stated she is IADL'S, retired & drives.  Pt 44yo son James Vega lives with her in a three level home.  Pt stated she has used home health in the past however cannot remember the agency name.  Pt has been to subacute rehab in past at [facility] in [place], [place].  Pt denies issues with affording her medications.  Pt stated she is pre-registered at [facility] in [place], [place] for rehab.  CCP called carlotta Abarca and she has confirmed this.  Packet has been made and given to Dulce CHRISTY RN to call report to facility.  Per Rima request, Pt needs a 30 day/PAS signed by MD.  A call has been placed to Dr. Yeboah to obtain signature for 30 day form.  Dr. Yeboah confirmed he will come to CCP office after lunch to complete form.  CCP following.                Discharge Codes     None        Expected Discharge Date and Time     Expected Discharge Date Expected Discharge Time    Oct 6, 2017             Mayra Ambrosio RN

## 2017-10-06 NOTE — PROGRESS NOTES
Clinical Pharmacy Services: Medication History    Temi Ho is a 67 y.o. female presenting to [facility] for CHF (congestive heart failure) [I50.9]  CHF (congestive heart failure) [I50.9]    She  has a past medical history of Arthritis; Atrial fibrillation; BBB (bundle branch block); CHF (congestive heart failure); COPD (chronic obstructive pulmonary disease); Diabetes mellitus; Disease of thyroid gland; GERD (gastroesophageal reflux disease); Hyperlipidemia; Hypertension; IBS (irritable bowel syndrome); and Obesity.    Allergies as of 10/05/2017 - Dash as Reviewed 10/05/2017   Allergen Reaction Noted   • Ace inhibitors Angioedema 10/05/2017   • Coreg [carvedilol] Other (See Comments) 10/05/2017   • Iodine Angioedema 10/05/2017   • Metoprolol Swelling 10/05/2017   • Spironolactone Other (See Comments) 10/05/2017   • Statins Other (See Comments) 10/05/2017   • Aleve [naproxen sodium] Rash 10/05/2017   • Amoxicillin Rash 10/05/2017   • Latex Rash 10/05/2017   • Tylenol [acetaminophen] Rash 10/05/2017       Medication information was obtained from: Patient and patient's pharmacy  Preferred Pharmacy        [address] - [address]  - [phone] [place] [phone] [facility]       [address] [facility] [address]       Phone: [phone] Fax: [phone]       Not a 24 hour pharmacy; exact hours not known          Prior to Admission Medications     Prescriptions Last Dose Informant Patient Reported? Taking?    Amino Acids (L-CARNITINE PO)  Self Yes Yes    Take 400 mg by mouth Daily.    aspirin 325 MG tablet 10/4/2017  Yes Yes    Take 325 mg by mouth Daily.    cetirizine (zyrTEC) 10 MG tablet 10/4/2017  Yes Yes    Take 10 mg by mouth Daily.    Coenzyme Q10 (CO Q 10 PO)  Self Yes Yes    Take  by mouth Daily.    doxepin (SINEquan) 25 MG capsule Past Week  Yes Yes    Take 50 mg by mouth At Night As Needed for Sleep.    FLUoxetine (PROzac) 20 MG capsule 10/4/2017  Yes Yes     Take 40 mg by mouth Daily.    levothyroxine (SYNTHROID, LEVOTHROID) 150 MCG tablet 10/4/2017  Yes Yes    Take 150 mcg by mouth Daily.    liothyronine (CYTOMEL) 25 MCG tablet 10/4/2017  Yes Yes    Take 25 mcg by mouth Daily.    nitroglycerin (NITROSTAT) 0.4 MG SL tablet Past Month  Yes Yes    Place 0.4 mg under the tongue Every 5 (Five) Minutes As Needed for Chest Pain. Take no more than 3 doses in 15 minutes.    omeprazole (priLOSEC) 20 MG capsule 10/4/2017 Self Yes Yes    Take 20 mg by mouth Daily.    potassium chloride (MICRO-K) 10 MEQ CR capsule 10/4/2017  Yes Yes    Take 20 mEq by mouth 2 (Two) Times a Day.    torsemide (DEMADEX) 20 MG tablet Past Week  Yes Yes    Take 40 mg by mouth Daily. Patient states she takes 20mg po once every 3 days or when noticeable swelling.            Medication notes: Patient states she only takes torsemide 20mg po once every 3 days or when she notices visible swelling in her feet. She tries not to take it due to issues with constantly having to use the bathroom. Discussed the importance of this medication with the patient and weighing herself daily.    This medication list is complete to the best of my knowledge as of 10/6/2017    Please call if questions.    Jenise Sanchez, Juan Antonio  10/6/2017 11:21 AM

## 2017-10-06 NOTE — PLAN OF CARE
Problem: Patient Care Overview (Adult)  Goal: Plan of Care Review  Outcome: Outcome(s) achieved Date Met:  10/06/17    10/06/17 1048   Coping/Psychosocial Response Interventions   Plan Of Care Reviewed With patient   Patient Care Overview   Progress improving   Outcome Evaluation   Outcome Summary/Follow up Plan Pacemaker/ICD site WNL. Capturing and sensing appropriate. VSS. Ready for discharge pending CCP and chest xray result.

## 2017-10-06 NOTE — DISCHARGE PLACEMENT REQUEST
"Anitra Ho (67 y.o. Female)     Date of Birth Social Security Number Address Home Phone MRN    1950  [id number] [phone] [id number]    Bahai Marital Status          Islam        Admission Date Admission Type Admitting Provider Attending Provider Department, Room/Bed    10/5/17 Elective Yash Watts MD Gaitonde, Rajdeep S., MD [address] CVI, 2205/1    Discharge Date Discharge Disposition Discharge Destination         Rehab Facility or Unit (DC - External)             Attending Provider: Yash Watts MD     Allergies:  Ace Inhibitors, Coreg [Carvedilol], Iodine, Metoprolol, Spironolactone, Statins, Aleve [Naproxen Sodium], Amoxicillin, Latex, Tylenol [Acetaminophen]    Isolation:  None   Infection:  None   Code Status:  Not on file    Ht:  61\" (154.9 cm)   Wt:  204 lb 3.2 oz (92.6 kg)    Admission Cmt:  None   Principal Problem:  None                Active Insurance as of 10/5/2017     Primary Coverage     Payor Plan Insurance Group Employer/Plan Group    HUMANA MEDICARE REPLACEMENT HUMANA MEDICARE REPL [id number]     Payor Plan Address Payor Plan Phone Number Effective From Effective To    [address] [address] [phone] 8/1/2017     [place] [address]       Subscriber Name Subscriber Birth Date Member ID       ANITRA HO 1950 [id number]                 Emergency Contacts      (Rel.) Home Phone Work Phone Mobile Phone    SilviaJames (Son) [phone] -- --              "

## 2017-10-06 NOTE — PLAN OF CARE
Problem: Patient Care Overview (Adult)  Goal: Plan of Care Review  Outcome: Ongoing (interventions implemented as appropriate)    10/06/17 0553   Coping/Psychosocial Response Interventions   Plan Of Care Reviewed With patient   Patient Care Overview   Progress improving   Outcome Evaluation   Outcome Summary/Follow up Plan S/P ICD implantation. VSS. Pt has no complaints of pain. Dressing CDI, site soft. Likely D/C today. Will continue to monitor.        Goal: Adult Individualization and Mutuality  Outcome: Ongoing (interventions implemented as appropriate)    10/05/17 1641   Individualization   Patient Specific Preferences Prefers to be called Temi       Goal: Discharge Needs Assessment  Outcome: Ongoing (interventions implemented as appropriate)    10/05/17 1304 10/05/17 1339 10/05/17 1641   Discharge Needs Assessment   Concerns To Be Addressed --  --  other (see comments)   Concerns Comments --  --  patient is concerned about stairs at home   Living Environment   Transportation Available --  family or friend will provide;car --    Self-Care   Equipment Currently Used at Home cane, straight;walker, rolling;glucometer --  --          Problem: Cardiac: Heart Failure (Adult)  Goal: Signs and Symptoms of Listed Potential Problems Will be Absent or Manageable (Cardiac: Heart Failure)  Outcome: Ongoing (interventions implemented as appropriate)    10/05/17 1641 10/06/17 0553   Cardiac: Heart Failure   Problems Assessed (Heart Failure) --  all   Problems Present (Heart Failure) cardiac pump dysfunction;decreased quality of life;functional decline/self-care deficit;respiratory compromise --

## 2017-10-06 NOTE — NURSING NOTE
Left forearm IV infiltrated with Vancomycin.  Infusion stopped and moved to IV site in right wrist.  Transporter here to take patient back to room.  Ellen will tell Nurse about IV.

## 2018-07-02 ENCOUNTER — HOSPITAL ENCOUNTER (INPATIENT)
Facility: HOSPITAL | Age: 68
LOS: 2 days | Discharge: HOME | End: 2018-07-04
Attending: INTERNAL MEDICINE | Admitting: INTERNAL MEDICINE

## 2018-07-02 PROBLEM — I50.43 ACUTE ON CHRONIC SYSTOLIC AND DIASTOLIC HEART FAILURE, NYHA CLASS 2 (HCC): Status: ACTIVE | Noted: 2018-07-02

## 2018-07-02 LAB
ANION GAP SERPL CALCULATED.3IONS-SCNC: 8.6 MMOL/L
BUN BLD-MCNC: 19 MG/DL (ref 8–23)
BUN/CREAT SERPL: 21.6 (ref 7–25)
CALCIUM SPEC-SCNC: 8.6 MG/DL (ref 8.6–10.5)
CHLORIDE SERPL-SCNC: 98 MMOL/L (ref 98–107)
CO2 SERPL-SCNC: 30.4 MMOL/L (ref 22–29)
CREAT BLD-MCNC: 0.88 MG/DL (ref 0.57–1)
GFR SERPL CREATININE-BSD FRML MDRD: 64 ML/MIN/1.73
GLUCOSE BLD-MCNC: 128 MG/DL (ref 65–99)
GLUCOSE BLDC GLUCOMTR-MCNC: 133 MG/DL (ref 70–130)
MAGNESIUM SERPL-MCNC: 2.6 MG/DL (ref 1.6–2.4)
POTASSIUM BLD-SCNC: 4.5 MMOL/L (ref 3.5–5.2)
SODIUM BLD-SCNC: 137 MMOL/L (ref 136–145)

## 2018-07-02 PROCEDURE — 25010000002 MAGNESIUM SULFATE IN D5W 1G/100ML (PREMIX) 1-5 GM/100ML-% SOLUTION: Performed by: NURSE PRACTITIONER

## 2018-07-02 PROCEDURE — 93005 ELECTROCARDIOGRAM TRACING: CPT | Performed by: INTERNAL MEDICINE

## 2018-07-02 PROCEDURE — 82962 GLUCOSE BLOOD TEST: CPT

## 2018-07-02 PROCEDURE — 83735 ASSAY OF MAGNESIUM: CPT | Performed by: NURSE PRACTITIONER

## 2018-07-02 PROCEDURE — 80048 BASIC METABOLIC PNL TOTAL CA: CPT | Performed by: NURSE PRACTITIONER

## 2018-07-02 PROCEDURE — 25010000002 AMIODARONE IN DEXTROSE 5% 360-4.14 MG/200ML-% SOLUTION: Performed by: NURSE PRACTITIONER

## 2018-07-02 RX ORDER — LEVOTHYROXINE SODIUM 0.15 MG/1
150 TABLET ORAL DAILY
Status: DISCONTINUED | OUTPATIENT
Start: 2018-07-02 | End: 2018-07-04 | Stop reason: HOSPADM

## 2018-07-02 RX ORDER — MAGNESIUM SULFATE 1 G/100ML
2 INJECTION INTRAVENOUS ONCE
Status: COMPLETED | OUTPATIENT
Start: 2018-07-02 | End: 2018-07-02

## 2018-07-02 RX ORDER — NITROGLYCERIN 0.4 MG/1
0.4 TABLET SUBLINGUAL
Status: DISCONTINUED | OUTPATIENT
Start: 2018-07-02 | End: 2018-07-04 | Stop reason: HOSPADM

## 2018-07-02 RX ORDER — DIGOXIN 125 MCG
125 TABLET ORAL
Status: DISCONTINUED | OUTPATIENT
Start: 2018-07-02 | End: 2018-07-04 | Stop reason: HOSPADM

## 2018-07-02 RX ORDER — FLUOXETINE HYDROCHLORIDE 20 MG/1
40 CAPSULE ORAL DAILY
Status: DISCONTINUED | OUTPATIENT
Start: 2018-07-02 | End: 2018-07-04 | Stop reason: HOSPADM

## 2018-07-02 RX ORDER — CETIRIZINE HYDROCHLORIDE 10 MG/1
10 TABLET ORAL DAILY
Status: DISCONTINUED | OUTPATIENT
Start: 2018-07-02 | End: 2018-07-04 | Stop reason: HOSPADM

## 2018-07-02 RX ORDER — POTASSIUM CHLORIDE 750 MG/1
20 CAPSULE, EXTENDED RELEASE ORAL DAILY
Status: DISCONTINUED | OUTPATIENT
Start: 2018-07-02 | End: 2018-07-04 | Stop reason: HOSPADM

## 2018-07-02 RX ORDER — ASPIRIN 325 MG
325 TABLET ORAL DAILY
Status: DISCONTINUED | OUTPATIENT
Start: 2018-07-02 | End: 2018-07-03

## 2018-07-02 RX ORDER — LIOTHYRONINE SODIUM 25 UG/1
25 TABLET ORAL DAILY
Status: DISCONTINUED | OUTPATIENT
Start: 2018-07-02 | End: 2018-07-04 | Stop reason: HOSPADM

## 2018-07-02 RX ORDER — DOXEPIN HYDROCHLORIDE 25 MG/1
50 CAPSULE ORAL NIGHTLY PRN
Status: DISCONTINUED | OUTPATIENT
Start: 2018-07-02 | End: 2018-07-04 | Stop reason: HOSPADM

## 2018-07-02 RX ORDER — SODIUM CHLORIDE 0.9 % (FLUSH) 0.9 %
1-10 SYRINGE (ML) INJECTION AS NEEDED
Status: DISCONTINUED | OUTPATIENT
Start: 2018-07-02 | End: 2018-07-04 | Stop reason: HOSPADM

## 2018-07-02 RX ORDER — PANTOPRAZOLE SODIUM 40 MG/1
40 TABLET, DELAYED RELEASE ORAL EVERY MORNING
Status: DISCONTINUED | OUTPATIENT
Start: 2018-07-02 | End: 2018-07-04 | Stop reason: HOSPADM

## 2018-07-02 RX ORDER — ACETAMINOPHEN 325 MG/1
650 TABLET ORAL EVERY 4 HOURS PRN
Status: DISCONTINUED | OUTPATIENT
Start: 2018-07-02 | End: 2018-07-04 | Stop reason: HOSPADM

## 2018-07-02 RX ORDER — BUMETANIDE 0.25 MG/ML
1 INJECTION INTRAMUSCULAR; INTRAVENOUS EVERY 8 HOURS
Status: DISPENSED | OUTPATIENT
Start: 2018-07-02 | End: 2018-07-03

## 2018-07-02 RX ORDER — DIGOXIN 125 MCG
125 TABLET ORAL
COMMUNITY

## 2018-07-02 RX ADMIN — PANTOPRAZOLE SODIUM 40 MG: 40 TABLET, DELAYED RELEASE ORAL at 13:04

## 2018-07-02 RX ADMIN — BUMETANIDE 1 MG: 0.25 INJECTION INTRAMUSCULAR; INTRAVENOUS at 22:37

## 2018-07-02 RX ADMIN — POTASSIUM CHLORIDE 20 MEQ: 750 CAPSULE, EXTENDED RELEASE ORAL at 13:05

## 2018-07-02 RX ADMIN — ACETAMINOPHEN 650 MG: 325 TABLET, FILM COATED ORAL at 14:31

## 2018-07-02 RX ADMIN — LEVOTHYROXINE SODIUM 150 MCG: 150 TABLET ORAL at 12:05

## 2018-07-02 RX ADMIN — NITROGLYCERIN 0.4 MG: 0.4 TABLET SUBLINGUAL at 12:30

## 2018-07-02 RX ADMIN — AMIODARONE HYDROCHLORIDE 1 MG/MIN: 1.8 INJECTION, SOLUTION INTRAVENOUS at 14:48

## 2018-07-02 RX ADMIN — SACUBITRIL AND VALSARTAN 1 TABLET: 24; 26 TABLET, FILM COATED ORAL at 12:06

## 2018-07-02 RX ADMIN — ACETAMINOPHEN 650 MG: 325 TABLET, FILM COATED ORAL at 17:49

## 2018-07-02 RX ADMIN — MAGNESIUM SULFATE HEPTAHYDRATE 2 G: 1 INJECTION, SOLUTION INTRAVENOUS at 13:05

## 2018-07-02 RX ADMIN — APIXABAN 5 MG: 5 TABLET, FILM COATED ORAL at 22:36

## 2018-07-02 RX ADMIN — AMIODARONE HYDROCHLORIDE 0.5 MG/MIN: 1.8 INJECTION, SOLUTION INTRAVENOUS at 22:12

## 2018-07-02 RX ADMIN — FLUOXETINE HYDROCHLORIDE 40 MG: 20 CAPSULE ORAL at 12:06

## 2018-07-02 RX ADMIN — BUMETANIDE 1 MG: 0.25 INJECTION INTRAMUSCULAR; INTRAVENOUS at 16:03

## 2018-07-02 RX ADMIN — DIGOXIN 125 MCG: 0.12 TABLET ORAL at 12:06

## 2018-07-02 RX ADMIN — CETIRIZINE HYDROCHLORIDE 10 MG: 10 TABLET, FILM COATED ORAL at 12:06

## 2018-07-02 RX ADMIN — APIXABAN 5 MG: 5 TABLET, FILM COATED ORAL at 12:05

## 2018-07-02 RX ADMIN — DOXEPIN HYDROCHLORIDE 25 MG: 25 CAPSULE ORAL at 22:36

## 2018-07-02 RX ADMIN — LIOTHYRONINE SODIUM 25 MCG: 25 TABLET ORAL at 12:06

## 2018-07-02 NOTE — PLAN OF CARE
Problem: Patient Care Overview  Goal: Plan of Care Review  Outcome: Ongoing (interventions implemented as appropriate)   07/02/18 1951   Coping/Psychosocial   Plan of Care Reviewed With patient   Plan of Care Review   Progress no change   OTHER   Outcome Summary Patient is a transfer from CCU with history of her ICD firing off multiple times. Currently is Vpaced with probable A-fib underlining. Will have stress test in the AM, and future management will be based upon this test.       Problem: Fall Risk (Adult)  Goal: Identify Related Risk Factors and Signs and Symptoms  Outcome: Outcome(s) achieved Date Met: 07/02/18    Goal: Absence of Fall  Outcome: Ongoing (interventions implemented as appropriate)      Problem: Arrhythmia/Dysrhythmia (Symptomatic) (Adult)  Goal: Signs and Symptoms of Listed Potential Problems Will be Absent, Minimized or Managed (Arrhythmia/Dysrhythmia)  Outcome: Ongoing (interventions implemented as appropriate)      Problem: Pain, Acute (Adult)  Goal: Identify Related Risk Factors and Signs and Symptoms  Outcome: Outcome(s) achieved Date Met: 07/02/18    Goal: Acceptable Pain Control/Comfort Level  Outcome: Ongoing (interventions implemented as appropriate)      Problem: Skin Injury Risk (Adult)  Goal: Identify Related Risk Factors and Signs and Symptoms  Outcome: Outcome(s) achieved Date Met: 07/02/18    Goal: Skin Health and Integrity  Outcome: Ongoing (interventions implemented as appropriate)

## 2018-07-02 NOTE — NURSING NOTE
Spoke with BEN Noguera for Dr. Hassan. Informed that the patient is complaining of headache, and no orders received for Dr. Hassan planned stress test tomorrow.    Kandice Hickey RN

## 2018-07-02 NOTE — NURSING NOTE
Discussed code status with the patient, her wishes are to have CPR and emergency medication given, she does not wish to be intubated as she believes that if she were to be put on a vent she would not get off. Informed BEN Noguera for Dr. Hassan.    Kandice Hickey RN

## 2018-07-02 NOTE — H&P
Patient Care Team:  Dash Santos MD as PCP - General (Family Medicine)  Zenaida Silverio, RN as Registered Nurse    Chief complaint ICD shock    Subjective     Patient is a 68 y.o. female was awake at 4:30AM with pain in her upper jaw.  She got up to go get some NTG and when she got to the kitchen she felt a shock from her ICD.  She was able to hold on to furniture and did not fall.  She was shocked 3-4 times in the kitchen.  He son drove her to [facility] ER and on the way she continued to be shocked several times.  EKG in the ER showed Afib with LBBB.  Interrogation of device shows 7 episodes of VT shocked, 2 episodes of VF shocked as well as two episodes of SVT.  Amio gtt was started and continues.  Currently during my interview she feels well without SOA but does have soreness of her chest.    She takes Demadex when she notes swelling of her BLE.  She has been busy and unable to take Demedex in the last 3 days even though she has noticed swelling.  She tired Melatonin last night for the first time due to a week of insomnia.  She is of sound mind and has expressed her wishes for code status to be all treatment except for intubation.        Review of Systems   All systems were reviewed and negative except for:  Constitution:  positive for Insomnia for 1 week  ENT:  positive for hearing loss  Cardiovascular: positive for  chest pressure / pain, at rest and irregular HR, swelling of BLE for last 3 days.      History  Past Medical History:   Diagnosis Date   • Arthritis    • Atrial fibrillation    • BBB (bundle branch block)    • CHF (congestive heart failure) EF 25%, sys and sparrow.    • COPD (chronic obstructive pulmonary disease)    • Diabetes mellitus II    • Disease of thyroid gland    • GERD (gastroesophageal reflux disease)    • Hyperlipidemia    • Hypertension    • IBS (irritable bowel syndrome)-intermittent diarrhea and constipation    • Obesity    NICM  Depression with 100lb wt loss and death of   in 10/2017  Intolerant to BB and angioedema with ACE        Past Surgical History:   Procedure Laterality Date   • BILATERAL OOPHORECTOMY     • CARDIAC ELECTROPHYSIOLOGY PROCEDURE N/A 10/5/2017    Procedure: Implant ICD - bi ventricular;  Surgeon: Yash Watts MD;  Location: [facility] INVASIVE LOCATION;  Service:    • CHOLECYSTECTOMY     • HYSTERECTOMY     • JOINT REPLACEMENT      bilateral knees     Family History   Problem Relation Age of Onset   • Heart disease Mother    • Cancer Mother    • Hypertension Mother    • Heart disease Father    • Hypertension Father    • Aortic aneurysm Sister      Social History   Substance Use Topics   • Smoking status: Former Smoker     Packs/day: 2.50     Years: 45.00     Types: Cigarettes     Start date: 1962     Quit date: 2007   • Smokeless tobacco: Never Used   • Alcohol use 0.6 oz/week     1 Glasses of wine per week      Comment: rarely     Prescriptions Prior to Admission   Medication Sig Dispense Refill Last Dose   • apixaban (ELIQUIS) 5 MG tablet tablet Take 5 mg by mouth 1 (One) Time. Pt unsure of dose       • digoxin (LANOXIN) 125 MCG tablet Take 125 mcg by mouth Daily.      • Ivabradine HCl (CORLANOR PO) Take 5 mg by mouth 2 (Two) Times a Day.      • sacubitril-valsartan (ENTRESTO) 24-26 MG tablet Take 1 tablet by mouth Daily.      • Amino Acids (L-CARNITINE PO) Take 400 mg by mouth Daily.      • aspirin 325 MG tablet Take 325 mg by mouth Daily.   10/4/2017 at Unknown time   • cetirizine (zyrTEC) 10 MG tablet Take 10 mg by mouth Daily.   10/4/2017 at Unknown time   • Coenzyme Q10 (CO Q 10 PO) Take  by mouth Daily.      • doxepin (SINEquan) 25 MG capsule Take 50 mg by mouth At Night As Needed for Sleep.   Past Week at Unknown time   • FLUoxetine (PROzac) 20 MG capsule Take 40 mg by mouth Daily.   10/4/2017 at Unknown time   • levothyroxine (SYNTHROID, LEVOTHROID) 150 MCG tablet Take 150 mcg by mouth Daily.   10/4/2017 at Unknown time   • liothyronine  (CYTOMEL) 25 MCG tablet Take 25 mcg by mouth Daily.   10/4/2017 at Unknown time   • nitroglycerin (NITROSTAT) 0.4 MG SL tablet Place 0.4 mg under the tongue Every 5 (Five) Minutes As Needed for Chest Pain. Take no more than 3 doses in 15 minutes.   Past Month at Unknown time   • omeprazole (priLOSEC) 20 MG capsule Take 20 mg by mouth Daily.   10/4/2017 at Unknown time   • potassium chloride (MICRO-K) 10 MEQ CR capsule Take 20 mEq by mouth 2 (Two) Times a Day.   10/4/2017 at Unknown time   • torsemide (DEMADEX) 20 MG tablet Take 40 mg by mouth Daily. Patient states she takes 20mg po once every 3 days or when noticeable swelling.   Past Week at Unknown time     Social History     Social History   • Marital status:  last year     Spouse name: N/A   • Number of children: N/A   • Years of education: N/A     Occupational History   • Not on file.     Social History Main Topics   • Smoking status: Former Smoker     Packs/day: 2.50     Years: 45.00     Types: Cigarettes     Start date: 1962     Quit date: 2007   • Smokeless tobacco: Never Used   • Alcohol use 0.6 oz/week     1 Glasses of wine per week      Comment: rarely   • Drug use: No      Comment: used cannabis oil as bronchodilator for a few days a couple weeks ago, but nothing since.   • Sexual activity: Defer     Other Topics Concern   • Not on file     Social History Narrative   • No narrative on file      Family History   Problem Relation Age of Onset   • Heart disease Mother    • Cancer Mother    • Hypertension Mother    • Heart disease Father    • Hypertension Father    • Aortic aneurysm Sister         Objective     Vital Signs  Temp:  [99.7 °F (37.6 °C)] 99.7 °F (37.6 °C)  Heart Rate:  [85-87] 85  BP: ()/(44-77) 110/44    Physical Exam:      General Appearance:    Alert, cooperative, in no acute distress   Head:    Normocephalic, without obvious abnormality, atraumatic   Eyes:            Lids and lashes normal, conjunctivae and sclerae normal, no    icterus, no pallor, corneas clear, PERRLA   Ears:    Ears appear intact with no abnormalities noted   Throat:   No oral lesions, no thrush, oral mucosa moist   Neck:   No adenopathy, supple, trachea midline, no carotid bruit, no JVD       Lungs:     Clear to auscultation,respirations regular, even and                   unlabored    Heart:    Irregular rhythm and normal rate, normal S1 and S2, no            murmur, no gallop, no rub, no click   Breast Exam:    Deferred   Abdomen:     Normal bowel sounds, soft        non-tender, non-distended, no guarding   Genitalia:    Deferred   Extremities:   Moves all extremities well, no edema, no cyanosis, no              redness   Pulses:   Pulses palpable and equal bilaterally   Skin:   No bleeding, bruising or rash   Lymph nodes:   No palpable adenopathy   Neurologic:   Cranial nerves 2 - 12 grossly intact, sensation intact, DTR        present and equal bilaterally     Results Review:    I reviewed the patient's new clinical results.   WBC 4.97, Plt 281, H/H 13.6/41.1, Gluc 136, BUN 19, Cr 0.7, Na 140, K 3.6, AST 20, ALT 16, Mag 1.61, CPK 41, Troponin <0.010, ProBNP 1107, PT 14.4, INR 1.21, PTT 38.4, Digoxin 0.3.   CXR no acute cardiopulmonary process.    Assessment/Plan   Afib with RVR, now with controlled rate, on Eliquis  VT/VF with ICD shock  NICM EF 25%  DM II  HTN  LBBB  Intolerant to BB and Angioedema with ACE but tolerating once a day Entresto at this time  Depression  Hypomagnesemia    Amio gtt  Dr. Chowdary with EP to evaluate  Limited code status orders  Admit orders  [facility] with bedrest until tomorrow then d/c  Replace Mag then recheck BMP and Mag today  Home medications continued except Demadex  DVT-on Eliquis    I discussed the patients findings and my recommendations with patient, family and Dr. Foote. And Dr. Chowdary.    Poornima Arias, APRN  07/02/18  10:31 AM    Time: 45 min     Carlos A Foote M.D.   Reviewed our cardiology group Nurse Practitioner's note.     Pt interviewed and examined.   Findings verified.   Reviewed and agree with corrections or modifications of history, physical, and plans as indicated:     Discussed in detail with the patient, patient's nurse, physician down at [facility], and Dr. Sumner, who reviewed the interrogation done in [facility].  New onset atrial fibrillation.  Interrogation showed all atrial fibrillation.  There was no VT, no SVT.      This was probably aggravated by increased fluid status, with not taking diuretics for several days, with low ejection fraction.  Also probably aggravated by sleep-disordered breathing.  Patient states she cannot get to sleep easily at night, wakes suddenly, and can't get back to sleep.  This is frequent, and relatively recent.    On the floor today she had chest pain again, the lead by nitroglycerin.  Before she goes home we will make sure there is no ischemic activity as part of this atrial fibrillation.  A little bit more diuresis today.  I gave her a dose of Bumex IV at [facility], we will give a little bit more today.    If the stress test tomorrow is normal, okay to go home.  Give 3 doses of IV Bumex and.  Try to get her back to baseline.  Okay to get out of the intensive care.  Short duration of atrial fibrillation.  If nothing shows up tomorrow on the stress test, does not need long-term Eliquis, as long as no other paroxysmal atrial fibrillation is noted on the interrogation.      EMR Dragon/Transcription disclaimer:   Much of this encounter note is an electronic transcription/translation of spoken language to printed text. The electronic translation of spoken language may permit erroneous, or at times, nonsensical words or phrases to be inadvertently transcribed.  Although I have reviewed the note for such errors, some may still exist. Please contact me if needed for clarification or correction.  Carlos A Foote M.D.

## 2018-07-02 NOTE — NURSING NOTE
Patient is refusing to have her velez pulled until tomorrow morning.  She was educated as to the reason why and the impact of keeping it in for any extended amount of time.  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

## 2018-07-03 ENCOUNTER — APPOINTMENT (OUTPATIENT)
Dept: NUCLEAR MEDICINE | Facility: HOSPITAL | Age: 68
End: 2018-07-03
Attending: INTERNAL MEDICINE

## 2018-07-03 LAB
ANION GAP SERPL CALCULATED.3IONS-SCNC: 10.2 MMOL/L
BASOPHILS # BLD AUTO: 0.02 10*3/MM3 (ref 0–0.2)
BASOPHILS NFR BLD AUTO: 0.3 % (ref 0–1.5)
BH CV STRESS COMMENTS STAGE 1: NORMAL
BH CV STRESS DOSE REGADENOSON STAGE 1: 0.4
BH CV STRESS DURATION MIN STAGE 1: 0
BH CV STRESS DURATION SEC STAGE 1: 10
BH CV STRESS PROTOCOL 1: NORMAL
BH CV STRESS RECOVERY BP: NORMAL MMHG
BH CV STRESS RECOVERY HR: 90 BPM
BH CV STRESS STAGE 1: 1
BUN BLD-MCNC: 21 MG/DL (ref 8–23)
BUN/CREAT SERPL: 24.4 (ref 7–25)
CALCIUM SPEC-SCNC: 8.6 MG/DL (ref 8.6–10.5)
CHLORIDE SERPL-SCNC: 103 MMOL/L (ref 98–107)
CHOLEST SERPL-MCNC: 192 MG/DL (ref 0–200)
CO2 SERPL-SCNC: 29.8 MMOL/L (ref 22–29)
CREAT BLD-MCNC: 0.86 MG/DL (ref 0.57–1)
DEPRECATED RDW RBC AUTO: 47.9 FL (ref 37–54)
EOSINOPHIL # BLD AUTO: 0.15 10*3/MM3 (ref 0–0.7)
EOSINOPHIL NFR BLD AUTO: 2.6 % (ref 0.3–6.2)
ERYTHROCYTE [DISTWIDTH] IN BLOOD BY AUTOMATED COUNT: 15 % (ref 11.7–13)
GFR SERPL CREATININE-BSD FRML MDRD: 65 ML/MIN/1.73
GLUCOSE BLD-MCNC: 107 MG/DL (ref 65–99)
HBA1C MFR BLD: 5.4 % (ref 4.8–5.6)
HCT VFR BLD AUTO: 38.3 % (ref 35.6–45.5)
HDLC SERPL-MCNC: 61 MG/DL (ref 40–60)
HGB BLD-MCNC: 11.8 G/DL (ref 11.9–15.5)
IMM GRANULOCYTES # BLD: 0 10*3/MM3 (ref 0–0.03)
IMM GRANULOCYTES NFR BLD: 0 % (ref 0–0.5)
LDLC SERPL CALC-MCNC: 119 MG/DL (ref 0–100)
LDLC/HDLC SERPL: 1.96 {RATIO}
LV EF NUC BP: 41 %
LYMPHOCYTES # BLD AUTO: 1.63 10*3/MM3 (ref 0.9–4.8)
LYMPHOCYTES NFR BLD AUTO: 27.7 % (ref 19.6–45.3)
MAGNESIUM SERPL-MCNC: 2.2 MG/DL (ref 1.6–2.4)
MAXIMAL PREDICTED HEART RATE: 150 BPM
MCH RBC QN AUTO: 27 PG (ref 26.9–32)
MCHC RBC AUTO-ENTMCNC: 30.8 G/DL (ref 32.4–36.3)
MCV RBC AUTO: 87.6 FL (ref 80.5–98.2)
MONOCYTES # BLD AUTO: 0.57 10*3/MM3 (ref 0.2–1.2)
MONOCYTES NFR BLD AUTO: 9.7 % (ref 5–12)
NEUTROPHILS # BLD AUTO: 3.51 10*3/MM3 (ref 1.9–8.1)
NEUTROPHILS NFR BLD AUTO: 59.7 % (ref 42.7–76)
PERCENT MAX PREDICTED HR: 75.33 %
PLATELET # BLD AUTO: 190 10*3/MM3 (ref 140–500)
PMV BLD AUTO: 10.7 FL (ref 6–12)
POTASSIUM BLD-SCNC: 3.9 MMOL/L (ref 3.5–5.2)
RBC # BLD AUTO: 4.37 10*6/MM3 (ref 3.9–5.2)
SODIUM BLD-SCNC: 143 MMOL/L (ref 136–145)
STRESS BASELINE BP: NORMAL MMHG
STRESS BASELINE HR: 77 BPM
STRESS PERCENT HR: 89 %
STRESS POST PEAK BP: NORMAL MMHG
STRESS POST PEAK HR: 113 BPM
STRESS TARGET HR: 128 BPM
TRIGL SERPL-MCNC: 58 MG/DL (ref 0–150)
TSH SERPL DL<=0.05 MIU/L-ACNC: 0.02 MIU/ML (ref 0.27–4.2)
VLDLC SERPL-MCNC: 11.6 MG/DL (ref 5–40)
WBC NRBC COR # BLD: 5.88 10*3/MM3 (ref 4.5–10.7)

## 2018-07-03 PROCEDURE — 83036 HEMOGLOBIN GLYCOSYLATED A1C: CPT | Performed by: NURSE PRACTITIONER

## 2018-07-03 PROCEDURE — 80048 BASIC METABOLIC PNL TOTAL CA: CPT | Performed by: NURSE PRACTITIONER

## 2018-07-03 PROCEDURE — 0 TECHNETIUM SESTAMIBI: Performed by: INTERNAL MEDICINE

## 2018-07-03 PROCEDURE — 78452 HT MUSCLE IMAGE SPECT MULT: CPT

## 2018-07-03 PROCEDURE — 94799 UNLISTED PULMONARY SVC/PX: CPT

## 2018-07-03 PROCEDURE — 83735 ASSAY OF MAGNESIUM: CPT | Performed by: NURSE PRACTITIONER

## 2018-07-03 PROCEDURE — 80061 LIPID PANEL: CPT | Performed by: NURSE PRACTITIONER

## 2018-07-03 PROCEDURE — 85025 COMPLETE CBC W/AUTO DIFF WBC: CPT | Performed by: NURSE PRACTITIONER

## 2018-07-03 PROCEDURE — 93017 CV STRESS TEST TRACING ONLY: CPT

## 2018-07-03 PROCEDURE — A9500 TC99M SESTAMIBI: HCPCS | Performed by: INTERNAL MEDICINE

## 2018-07-03 PROCEDURE — 84443 ASSAY THYROID STIM HORMONE: CPT | Performed by: NURSE PRACTITIONER

## 2018-07-03 PROCEDURE — 25010000002 REGADENOSON 0.4 MG/5ML SOLUTION: Performed by: INTERNAL MEDICINE

## 2018-07-03 RX ORDER — AMIODARONE HYDROCHLORIDE 200 MG/1
200 TABLET ORAL EVERY 12 HOURS SCHEDULED
Status: DISCONTINUED | OUTPATIENT
Start: 2018-07-03 | End: 2018-07-04 | Stop reason: HOSPADM

## 2018-07-03 RX ADMIN — LEVOTHYROXINE SODIUM 150 MCG: 150 TABLET ORAL at 10:41

## 2018-07-03 RX ADMIN — LIOTHYRONINE SODIUM 25 MCG: 25 TABLET ORAL at 12:29

## 2018-07-03 RX ADMIN — TECHNETIUM TC 99M SESTAMIBI 1 DOSE: 1 INJECTION INTRAVENOUS at 10:40

## 2018-07-03 RX ADMIN — ASPIRIN 325 MG: 325 TABLET ORAL at 10:40

## 2018-07-03 RX ADMIN — AMIODARONE HYDROCHLORIDE 200 MG: 200 TABLET ORAL at 12:30

## 2018-07-03 RX ADMIN — SACUBITRIL AND VALSARTAN 1 TABLET: 24; 26 TABLET, FILM COATED ORAL at 10:41

## 2018-07-03 RX ADMIN — PANTOPRAZOLE SODIUM 40 MG: 40 TABLET, DELAYED RELEASE ORAL at 06:24

## 2018-07-03 RX ADMIN — TECHNETIUM TC 99M SESTAMIBI 1 DOSE: 1 INJECTION INTRAVENOUS at 06:15

## 2018-07-03 RX ADMIN — NITROGLYCERIN 0.4 MG: 0.4 TABLET SUBLINGUAL at 07:02

## 2018-07-03 RX ADMIN — DOXEPIN HYDROCHLORIDE 25 MG: 25 CAPSULE ORAL at 22:33

## 2018-07-03 RX ADMIN — DIGOXIN 125 MCG: 0.12 TABLET ORAL at 12:30

## 2018-07-03 RX ADMIN — APIXABAN 5 MG: 5 TABLET, FILM COATED ORAL at 21:05

## 2018-07-03 RX ADMIN — REGADENOSON 0.4 MG: 0.08 INJECTION, SOLUTION INTRAVENOUS at 10:40

## 2018-07-03 RX ADMIN — POTASSIUM CHLORIDE 20 MEQ: 750 CAPSULE, EXTENDED RELEASE ORAL at 10:40

## 2018-07-03 RX ADMIN — FLUOXETINE HYDROCHLORIDE 40 MG: 20 CAPSULE ORAL at 10:41

## 2018-07-03 RX ADMIN — AMIODARONE HYDROCHLORIDE 200 MG: 200 TABLET ORAL at 21:05

## 2018-07-03 RX ADMIN — CETIRIZINE HYDROCHLORIDE 10 MG: 10 TABLET, FILM COATED ORAL at 10:41

## 2018-07-03 NOTE — PLAN OF CARE
Problem: Patient Care Overview  Goal: Plan of Care Review  Outcome: Ongoing (interventions implemented as appropriate)   07/03/18 0546   Coping/Psychosocial   Plan of Care Reviewed With patient   Plan of Care Review   Progress improving   OTHER   Outcome Summary No arrhythmias overnight. Pt still on amiodarone drip. Awaiting stress test this am. F/C in place per patients request. Will continue to monitor closely       Problem: Fall Risk (Adult)  Goal: Absence of Fall  Outcome: Ongoing (interventions implemented as appropriate)      Problem: Arrhythmia/Dysrhythmia (Symptomatic) (Adult)  Goal: Signs and Symptoms of Listed Potential Problems Will be Absent, Minimized or Managed (Arrhythmia/Dysrhythmia)  Outcome: Ongoing (interventions implemented as appropriate)      Problem: Pain, Acute (Adult)  Goal: Acceptable Pain Control/Comfort Level  Outcome: Ongoing (interventions implemented as appropriate)      Problem: Skin Injury Risk (Adult)  Goal: Skin Health and Integrity  Outcome: Ongoing (interventions implemented as appropriate)

## 2018-07-03 NOTE — PROGRESS NOTES
Temi Vic   68 y.o.  female    LOS: 1 day   Patient Care Team:  Dash Santos MD as PCP - General (Family Medicine)  Zenaida Silverio RN as Registered Nurse      Subjective   No chest pain or dyspnea  Interval History:     Patient Complaints:     Review of Systems:       Medication Review:   Current Facility-Administered Medications:   •  acetaminophen (TYLENOL) tablet 650 mg, 650 mg, Oral, Q4H PRN, Poornima Arias APRN, 650 mg at 07/02/18 1749  •  amiodarone (NEXTERONE) 360 mg/200 mL (1.8 mg/mL) infusion, 1 mg/min, Intravenous, Continuous, BEN Loredo, Last Rate: 16.67 mL/hr at 07/02/18 2212, 0.5 mg/min at 07/02/18 2212  •  aspirin tablet 325 mg, 325 mg, Oral, Daily, Poornima Arias APRN, 325 mg at 07/03/18 1040  •  bumetanide (BUMEX) injection 1 mg, 1 mg, Intravenous, Q8H, Carlos A Foote MD, Stopped at 07/03/18 0624  •  cetirizine (zyrTEC) tablet 10 mg, 10 mg, Oral, Daily, Poornima Arias APRN, 10 mg at 07/03/18 1041  •  digoxin (LANOXIN) tablet 125 mcg, 125 mcg, Oral, Daily, Poornima Arias APRN, 125 mcg at 07/02/18 1206  •  doxepin (SINEquan) capsule 50 mg, 50 mg, Oral, Nightly PRN, Poornima Arias APRN, 25 mg at 07/02/18 2236  •  FLUoxetine (PROzac) capsule 40 mg, 40 mg, Oral, Daily, Poornima Arias, APRN, 40 mg at 07/03/18 1041  •  ivabradine HCl (CORLANOR) tablet 5 mg, 5 mg, Oral, BID, Poornima Arias APRN  •  levothyroxine (SYNTHROID, LEVOTHROID) tablet 150 mcg, 150 mcg, Oral, Daily, Poornima Arias, APRN, 150 mcg at 07/03/18 1041  •  liothyronine (CYTOMEL) tablet 25 mcg, 25 mcg, Oral, Daily, BEN Loredo, 25 mcg at 07/02/18 1206  •  nitroglycerin (NITROSTAT) SL tablet 0.4 mg, 0.4 mg, Sublingual, Q5 Min PRN, BEN Loredo, 0.4 mg at 07/03/18 0702  •  pantoprazole (PROTONIX) EC tablet 40 mg, 40 mg, Oral, QAM, BEN Loredo, 40 mg at 07/03/18 0624  •  potassium chloride (MICRO-K) CR capsule 20 mEq, 20 mEq, Oral, Daily, BEN Loredo, 20 mEq at 07/03/18 1040  •   sacubitril-valsartan (ENTRESTO) 24-26 MG tablet 1 tablet, 1 tablet, Oral, Daily, BEN Loredo, 1 tablet at 07/03/18 1041  •  sodium chloride 0.9 % flush 1-10 mL, 1-10 mL, Intravenous, PRN, BEN Loredo      Objective   Vital Sign Min/Max for last 24 hours  Temp  Min: 98 °F (36.7 °C)  Max: 98.2 °F (36.8 °C)   BP  Min: 102/56  Max: 151/74    Pulse  Min: 69  Max: 83     Wt Readings from Last 3 Encounters:   07/03/18 89 kg (196 lb 4.8 oz)   10/05/17 92.6 kg (204 lb 3.2 oz)        Intake/Output Summary (Last 24 hours) at 07/03/18 1046  Last data filed at 07/03/18 0545   Gross per 24 hour   Intake              200 ml   Output             2850 ml   Net            -2650 ml     Physical Exam:      General Appearance:    Well developed and well nourished in no acute distress   Head:    Normocephalic, atraumatic   Eyes:            Conjunctivae normal, non icteric, no xanthelasma   Neck:   supple, trachea midline, no thyromegaly, no carotid bruit, no jvd, no elevated cvp   Lungs:     Clear to auscultation,respirations regular, even and                  unlabored    Heart:    Regular rhythm and normal rate, normal S1 and S2,            No murmur, no gallop, no rub, no click   Chest Wall:    No abnormalities observed   Abdomen:     Normal bowel sounds, no masses, no organomegaly, soft        non-tender, non-distended, no guarding, no rebound                tenderness   Rectal:     Deferred   Extremities:   No edema. Moves all extremities well, no cyanosis, no erythema   Pulses:   Pulses palpable and equal bilaterally   Skin:   No bleeding, bruising or rash   Neurologic:   awake alert and oriented x3, speech clear and approp, no facial drooping     :    Monitor:      Results Review:         Sodium Sodium   Date Value Ref Range Status   07/03/2018 143 136 - 145 mmol/L Final   07/02/2018 137 136 - 145 mmol/L Final      Potassium Potassium   Date Value Ref Range Status   07/03/2018 3.9 3.5 - 5.2 mmol/L Final    07/02/2018 4.5 3.5 - 5.2 mmol/L Final      Chloride Chloride   Date Value Ref Range Status   07/03/2018 103 98 - 107 mmol/L Final   07/02/2018 98 98 - 107 mmol/L Final      Bicarbonate No results found for: PLASMABICARB   BUN BUN   Date Value Ref Range Status   07/03/2018 21 8 - 23 mg/dL Final   07/02/2018 19 8 - 23 mg/dL Final      Creatinine Creatinine   Date Value Ref Range Status   07/03/2018 0.86 0.57 - 1.00 mg/dL Final   07/02/2018 0.88 0.57 - 1.00 mg/dL Final      Calcium Calcium   Date Value Ref Range Status   07/03/2018 8.6 8.6 - 10.5 mg/dL Final   07/02/2018 8.6 8.6 - 10.5 mg/dL Final      Magnesium Magnesium   Date Value Ref Range Status   07/03/2018 2.2 1.6 - 2.4 mg/dL Final   07/02/2018 2.6 (H) 1.6 - 2.4 mg/dL Final          Results from last 7 days  Lab Units 07/03/18  0421   WBC 10*3/mm3 5.88   HEMOGLOBIN g/dL 11.8*   HEMATOCRIT % 38.3   PLATELETS 10*3/mm3 190     No results found for: TROPONINT  Lab Results   Component Value Date    CHOL 192 07/03/2018     Lab Results   Component Value Date    HDL 61 (H) 07/03/2018     Lab Results   Component Value Date     (H) 07/03/2018     Lab Results   Component Value Date    TRIG 58 07/03/2018     No components found for: CHOLHDL  No results found for: PTT  No components found for: PT/INR  Lab Results   Component Value Date    HGBA1C 5.40 07/03/2018      Lab Results   Component Value Date    TSH 0.016 (L) 07/03/2018        Echo EF Estimated  No results found for: ECHOEFEST      Assessment/ Plan    Active Hospital Problems (** Indicates Principal Problem)    Diagnosis Date Noted   • Acute on chronic systolic and diastolic heart failure, NYHA class 2 (CMS/HCC) [I50.43] 07/02/2018      Resolved Hospital Problems    Diagnosis Date Noted Date Resolved   No resolved problems to display.     Afib with RVR, now with controlled rate, on Eliquis  VT/VF with ICD shock  NICM EF 25%  DM II  HTN  LBBB  Intolerant to BB and Angioedema with ACE but tolerating once a  day Entresto at this time  Depression  Hypomagnesemia    All events probably atrial fibrillation per Dr. Chowdary who reviewed the interrogation from [place].  No V. tach or V. Fib.  Cardiolite today  Will start by mouth amiodarone.  Probably home in the morning if no ischemia.      Ruth Johnson MD  07/03/18  10:46 AM

## 2018-07-03 NOTE — PROGRESS NOTES
Discharge Planning Assessment  [facility]     Patient Name: Temi Ho  MRN: [id number]  Today's Date: 7/3/2018    Admit Date: 7/2/2018          Discharge Needs Assessment     Row Name 07/03/18 1219       Living Environment    Lives With child(shabbir), adult    Name(s) of Who Lives With Patient Jamse [phone]    Current Living Arrangements home/apartment/condo    Potentially Unsafe Housing Conditions other (see comments)   no concerns     Primary Care Provided by self    Provides Primary Care For no one    Family Caregiver if Needed child(shabbir), adult    Quality of Family Relationships helpful;involved;supportive    Able to Return to Prior Arrangements yes       Resource/Environmental Concerns    Resource/Environmental Concerns none    Home Accessibility Concerns stairs to access bedroom or bathroom    Transportation Concerns car, none       Transition Planning    Patient/Family Anticipates Transition to home    Transportation Anticipated family or friend will provide       Discharge Needs Assessment    Readmission Within the Last 30 Days no previous admission in last 30 days    Concerns to be Addressed denies needs/concerns at this time;no discharge needs identified    Equipment Currently Used at Home cane, straight    Anticipated Changes Related to Illness none    Equipment Needed After Discharge none            Discharge Plan     Row Name 07/03/18 1220       Plan    Plan Home     Patient/Family in Agreement with Plan yes    Plan Comments CCP met with patient at bedside. CCP role explained and discharge planning discussed. Face sheet verified and IMM noted. Patient's PCP is Dr. Santos. Patient lives with her son, in a three level home. Patient has steps leading to her bedroom and bathroom but does have handrails. Patient has grab bars present in her bathroom. Patient uses a cane for mobility as needed. Patient has been to [facility] in [place] and has used [facility]. Patient would  like [facility] if it is needed; patient declined referral at this time. Patient uses [facility] in [place] for her prescriptions; patient denies having trouble affording her medications and occasionally has trouble remembering her medications. Patient has transportation home. CCP will follow for discharge home and assist as needed. HH/SNF list left at bedside. Aliyah Styles CSW          Destination     No service coordination in this encounter.      Durable Medical Equipment     No service coordination in this encounter.      Dialysis/Infusion     No service coordination in this encounter.      Home Medical Care     No service coordination in this encounter.      Social Care     No service coordination in this encounter.                Demographic Summary     Row Name 07/03/18 1218       General Information    Admission Type inpatient    Arrived From emergency department    Required Notices Provided Important Message from Medicare    Referral Source admission list    Reason for Consult discharge planning    Preferred Language English     Used During This Interaction no            Functional Status     Row Name 07/03/18 1218       Functional Status    Usual Activity Tolerance good    Current Activity Tolerance good       Functional Status, IADL    Medications independent    Meal Preparation independent    Housekeeping independent    Laundry independent    Shopping independent       Mental Status    General Appearance WDL WDL       Mental Status Summary    Recent Changes in Mental Status/Cognitive Functioning no changes            Psychosocial    No documentation.           Abuse/Neglect    No documentation.           Legal    No documentation.           Substance Abuse    No documentation.           Patient Forms     Row Name 07/03/18 1222       Patient Forms    Provider Choice List Delivered    Delivered to Patient    Method of delivery In person          Nina Styles  CSW

## 2018-07-03 NOTE — CONSULTS
"Adult Nutrition  Assessment/PES    Patient Name:  Temi Ho  YOB: 1950  MRN: [id number]  Admit Date:  7/2/2018    Assessment Date:  7/3/2018    Comments:  Nursing nutrition consult. Weight loss over last 3 years about 50#. Last October admission patient weight 204#, current weight 196#.    No intake yet of HHD diet. Will follow.           Reason for Assessment     Row Name 07/03/18 1107          Reason for Assessment    Reason For Assessment nurse/nurse practitioner consult     Identified At Risk by Screening Criteria MST SCORE 2+             Nutrition/Diet History     Row Name 07/03/18 1107          Nutrition/Diet History    Typical Food/Fluid Intake Weighed 247# in 2015.  Hospital adm 10/2017 204#. Lost weight after death of spouse.  H/o depression, CHF. Takes demedex at home but has not taken it. On IV bumex now.             Anthropometrics     Row Name 07/03/18 1108 07/03/18 0545       Anthropometrics    Height 154.9 cm (60.98\")  --    Weight 88.9 kg (196 lb) 89 kg (196 lb 4.8 oz)       Ideal Body Weight (IBW)    Ideal Body Weight (IBW) (kg) 48.11  --    % Ideal Body Weight 184.8  --       Usual Body Weight (UBW)    Usual Body Weight 92.5 kg (204 lb)  --    % Usual Body Weight 96.08  --       Body Mass Index (BMI)    BMI (kg/m2) 37.13  --    BMI Assessment BMI 35-39.9: obesity grade II  --       IBW Adjustment, Para/Tetraplegia    5% Adjustment, Para (IBW) 45.7  --    10% Adjustment, Para (IBW) 43.3  --    10% Adjustment, Tetra (IBW) 43.3  --    15% Adjustment, Tetra (IBW) 40.89  --            Labs/Tests/Procedures/Meds     Row Name 07/03/18 1109          Labs/Procedures/Meds    Lab Results Reviewed reviewed, pertinent     Lab Results Comments gluc        Diagnostic Tests/Procedures    Diagnostic Test/Procedure Reviewed reviewed, pertinent        Medications    Pertinent Medications Reviewed reviewed, pertinent     Pertinent Medications Comments IV bumex, prozac, synthroid, PPI           " "  Physical Findings     Row Name 07/03/18 1109          Physical Findings    Overall Physical Appearance obese     Skin --   intact             Estimated/Assessed Needs     Row Name 07/03/18 1110 07/03/18 1108       Calculation Measurements    Weight Used For Calculations 45.4 kg (100 lb)  --    Height  -- 154.9 cm (60.98\")       Estimated/Assessed Needs    Additional Documentation Calorie Requirements (Group);Protein Requirements (Group);Fluid Requirements (Group)  --       Calorie Requirements    Estimated Calorie Requirement Comment [id number]  --       KCAL/KG    14 Kcal/Kg (kcal) 635.04  --    15 Kcal/Kg (kcal) 680.4  --    18 Kcal/Kg (kcal) 816.48  --    20 Kcal/Kg (kcal) 907.2  --    25 Kcal/Kg (kcal) 1134  --    30 Kcal/Kg (kcal) 1360.8  --    35 Kcal/Kg (kcal) 1587.6  --    40 Kcal/Kg (kcal) 1814.4  --    45 Kcal/Kg (kcal) 2041.2  --    50 Kcal/Kg (kcal) 2268  --       Lafourche-St. Jeor Equation    RMR (Lafourche-St. Jeor Equation) 910.72  --       Protein Requirements    Est Protein Requirement Amount (gms/kg) 1.2 gm protein  --    Estimated Protein Requirements (gms/day) 54.43  --       Fluid Requirements    Ayaan-Eugene Method (over 20 kg) 2407.2  --            Nutrition Prescription Ordered     Row Name 07/03/18 1111          Nutrition Prescription PO    Common Modifiers Cardiac             Evaluation of Received Nutrient/Fluid Intake     Row Name 07/03/18 1111 07/03/18 1110       Calculation Measurements    Weight Used For Calculations  -- 45.4 kg (100 lb)       PO Evaluation    Number of Days PO Intake Evaluated Insufficient Data  --    Row Name 07/03/18 1108          Calculation Measurements    Height 154.9 cm (60.98\")             Evaluation of Prescribed Nutrient/Fluid Intake     Row Name 07/03/18 1110 07/03/18 1108       Calculation Measurements    Weight Used For Calculations 45.4 kg (100 lb)  --    Height  -- 154.9 cm (60.98\")          Problem/Interventions:        Problem 1     Row Name 07/03/18 " 1112          Nutrition Diagnoses Problem 1    Problem 1 Nutrition Appropriate for Condition at this Time     Etiology (related to) MNT for Treatment/Condition                     Intervention Goal     Row Name 07/03/18 1112          Intervention Goal    General Disease management/therapy     PO Tolerate PO     Weight Appropriate weight loss             Nutrition Intervention     Row Name 07/03/18 1112          Nutrition Intervention    RD/Tech Action Follow Tx progress;Care plan reviewd               Education/Evaluation     Row Name 07/03/18 1112          Education    Education Will Instruct as appropriate        Monitor/Evaluation    Monitor Per protocol         Electronically signed by:  July Moncada RD  07/03/18 11:13 AM

## 2018-07-03 NOTE — PLAN OF CARE
Problem: Patient Care Overview  Goal: Plan of Care Review  Outcome: Ongoing (interventions implemented as appropriate)   07/03/18 1224   Coping/Psychosocial   Plan of Care Reviewed With patient   Plan of Care Review   Progress improving   OTHER   Outcome Summary no arrhythmias noted on cardiac monitor; AAOx3; amidodarone drip dc'd po started; f/c strict I/O's; no c/o pain;; stress test completed possible discharge in morning       Problem: Fall Risk (Adult)  Goal: Absence of Fall  Outcome: Ongoing (interventions implemented as appropriate)   07/03/18 1224   Fall Risk (Adult)   Absence of Fall making progress toward outcome       Problem: Arrhythmia/Dysrhythmia (Symptomatic) (Adult)  Goal: Signs and Symptoms of Listed Potential Problems Will be Absent, Minimized or Managed (Arrhythmia/Dysrhythmia)  Outcome: Ongoing (interventions implemented as appropriate)   07/03/18 1224   Goal/Outcome Evaluation   Problems Assessed (Arrhythmia/Dysrhythmia) all   Problems Present (Dysrhythmia) situational response       Problem: Pain, Acute (Adult)  Goal: Acceptable Pain Control/Comfort Level  Outcome: Ongoing (interventions implemented as appropriate)   07/03/18 1224   Pain, Acute (Adult)   Acceptable Pain Control/Comfort Level making progress toward outcome       Problem: Skin Injury Risk (Adult)  Goal: Skin Health and Integrity  Outcome: Ongoing (interventions implemented as appropriate)   07/03/18 1224   Skin Injury Risk (Adult)   Skin Health and Integrity making progress toward outcome

## 2018-07-04 VITALS
DIASTOLIC BLOOD PRESSURE: 92 MMHG | TEMPERATURE: 97.9 F | RESPIRATION RATE: 18 BRPM | HEIGHT: 61 IN | WEIGHT: 195.6 LBS | BODY MASS INDEX: 36.93 KG/M2 | SYSTOLIC BLOOD PRESSURE: 130 MMHG | OXYGEN SATURATION: 97 % | HEART RATE: 73 BPM

## 2018-07-04 LAB
ANION GAP SERPL CALCULATED.3IONS-SCNC: 10.6 MMOL/L
BASOPHILS # BLD AUTO: 0.03 10*3/MM3 (ref 0–0.2)
BASOPHILS NFR BLD AUTO: 0.5 % (ref 0–1.5)
BUN BLD-MCNC: 18 MG/DL (ref 8–23)
BUN/CREAT SERPL: 23.4 (ref 7–25)
CALCIUM SPEC-SCNC: 9.3 MG/DL (ref 8.6–10.5)
CHLORIDE SERPL-SCNC: 103 MMOL/L (ref 98–107)
CO2 SERPL-SCNC: 28.4 MMOL/L (ref 22–29)
CREAT BLD-MCNC: 0.77 MG/DL (ref 0.57–1)
DEPRECATED RDW RBC AUTO: 46.1 FL (ref 37–54)
EOSINOPHIL # BLD AUTO: 0.23 10*3/MM3 (ref 0–0.7)
EOSINOPHIL NFR BLD AUTO: 3.8 % (ref 0.3–6.2)
ERYTHROCYTE [DISTWIDTH] IN BLOOD BY AUTOMATED COUNT: 14.6 % (ref 11.7–13)
GFR SERPL CREATININE-BSD FRML MDRD: 74 ML/MIN/1.73
GLUCOSE BLD-MCNC: 100 MG/DL (ref 65–99)
HCT VFR BLD AUTO: 38.5 % (ref 35.6–45.5)
HGB BLD-MCNC: 12.4 G/DL (ref 11.9–15.5)
IMM GRANULOCYTES # BLD: 0.01 10*3/MM3 (ref 0–0.03)
IMM GRANULOCYTES NFR BLD: 0.2 % (ref 0–0.5)
LYMPHOCYTES # BLD AUTO: 1.64 10*3/MM3 (ref 0.9–4.8)
LYMPHOCYTES NFR BLD AUTO: 27.2 % (ref 19.6–45.3)
MAGNESIUM SERPL-MCNC: 2 MG/DL (ref 1.6–2.4)
MCH RBC QN AUTO: 27.6 PG (ref 26.9–32)
MCHC RBC AUTO-ENTMCNC: 32.2 G/DL (ref 32.4–36.3)
MCV RBC AUTO: 85.6 FL (ref 80.5–98.2)
MONOCYTES # BLD AUTO: 0.47 10*3/MM3 (ref 0.2–1.2)
MONOCYTES NFR BLD AUTO: 7.8 % (ref 5–12)
NEUTROPHILS # BLD AUTO: 3.65 10*3/MM3 (ref 1.9–8.1)
NEUTROPHILS NFR BLD AUTO: 60.7 % (ref 42.7–76)
NRBC BLD MANUAL-RTO: 0 /100 WBC (ref 0–0)
PLATELET # BLD AUTO: 200 10*3/MM3 (ref 140–500)
PMV BLD AUTO: 10.2 FL (ref 6–12)
POTASSIUM BLD-SCNC: 4.1 MMOL/L (ref 3.5–5.2)
RBC # BLD AUTO: 4.5 10*6/MM3 (ref 3.9–5.2)
SODIUM BLD-SCNC: 142 MMOL/L (ref 136–145)
WBC NRBC COR # BLD: 6.02 10*3/MM3 (ref 4.5–10.7)

## 2018-07-04 PROCEDURE — 85025 COMPLETE CBC W/AUTO DIFF WBC: CPT | Performed by: NURSE PRACTITIONER

## 2018-07-04 PROCEDURE — 80048 BASIC METABOLIC PNL TOTAL CA: CPT | Performed by: NURSE PRACTITIONER

## 2018-07-04 PROCEDURE — 83735 ASSAY OF MAGNESIUM: CPT | Performed by: NURSE PRACTITIONER

## 2018-07-04 RX ORDER — AMIODARONE HYDROCHLORIDE 200 MG/1
200 TABLET ORAL EVERY 12 HOURS SCHEDULED
Qty: 60 TABLET | Refills: 1 | Status: SHIPPED | OUTPATIENT
Start: 2018-07-04

## 2018-07-04 RX ORDER — TORSEMIDE 20 MG/1
20 TABLET ORAL DAILY
Qty: 30 TABLET | Refills: 3 | Status: SHIPPED | OUTPATIENT
Start: 2018-07-04

## 2018-07-04 RX ADMIN — CETIRIZINE HYDROCHLORIDE 10 MG: 10 TABLET, FILM COATED ORAL at 08:22

## 2018-07-04 RX ADMIN — POTASSIUM CHLORIDE 20 MEQ: 750 CAPSULE, EXTENDED RELEASE ORAL at 08:23

## 2018-07-04 RX ADMIN — AMIODARONE HYDROCHLORIDE 200 MG: 200 TABLET ORAL at 08:22

## 2018-07-04 RX ADMIN — DIGOXIN 125 MCG: 0.12 TABLET ORAL at 12:20

## 2018-07-04 RX ADMIN — LIOTHYRONINE SODIUM 25 MCG: 25 TABLET ORAL at 09:47

## 2018-07-04 RX ADMIN — LEVOTHYROXINE SODIUM 150 MCG: 150 TABLET ORAL at 09:47

## 2018-07-04 RX ADMIN — PANTOPRAZOLE SODIUM 40 MG: 40 TABLET, DELAYED RELEASE ORAL at 06:26

## 2018-07-04 RX ADMIN — FLUOXETINE HYDROCHLORIDE 40 MG: 20 CAPSULE ORAL at 08:22

## 2018-07-04 RX ADMIN — APIXABAN 5 MG: 5 TABLET, FILM COATED ORAL at 08:23

## 2018-07-04 RX ADMIN — SACUBITRIL AND VALSARTAN 1 TABLET: 24; 26 TABLET, FILM COATED ORAL at 08:22

## 2018-07-04 NOTE — DISCHARGE SUMMARY
Date of Discharge:  7/4/2018    Discharge Diagnosis: Atrial fibrillation with rapid ventricular rate associated with a left bundle branch block resulting in shocks from her ICD.  Also some acute on chronic systolic/diastolic heart failure    Presenting Problem/History of Present Illness  Acute on chronic systolic and diastolic heart failure, NYHA class 2 (CMS/HCC) [I50.43]     Patient Active Problem List   Diagnosis   • CHF (congestive heart failure) (CMS/[facility])   • Nonischemic cardiomyopathy (CMS/[facility])   • Acute on chronic systolic and diastolic heart failure, NYHA class 2 (CMS/[facility])            Hospital Course  Patient is a 68 y.o. female presented withPain in her upper jaw which awoke her at 4:30 AM on 7-18.  Her son drove her to the emergency room in [place] she continued to have several more shocks.  She may have had a total of 7 or 8 shocks from the ICD.  Initially there was a question of whether she was having ventricular tachycardia.  It appears from the notes from 70 and 7/3/18 at the shocks were actually related to rapid atrial fibrillation associated with a left bundle branch block.  Apparently the interrogation strips were reviewed by Dr. Chowdary  And seem to indicate that she did not have any ventricular tachycardia .  She had been started on an amiodarone drip at [place].  This was continued and she was subsequently switched over to oral amiodarone.  In view of the rapid atrial fibrillation and I will continue the oral amiodarone untill is she is seen in the office in one to 2 weeks.  She's been instructed not to drive until she seen in the office.  On the day of discharge her chest is clear she  appears to be euvolemic.  She has been receiving some Bumex here with good urine output.  Her BMP looks okay on the day of discharge.  She's had some low magnesium levels in the past and I advised her to stay off of PPIs and instead try using Pepcid or Zantac over-the-counter if necessary.  She will need BMP  next week or the week after  to recheck the renal function and potassium.  I'm advising her to take 20 mg of Demadex every day or every other day instead of the 40 mg that she usually took every 3 or 4 days.  She said she hadn't taken any Demadex for about 3 or 4 days prior to this admission.  She had a Cardiolite stress test which demonstrated a small apical infarct but no ischemia.  Therefore no indication at this point for cardiac catheterization.  She continues on her prior Eliquis for anticoagulation.  She  supposedly has an allergy to ACE inhibitor spironolactone and several beta blockers.  She is tolerating the once a day  Entresto so I'll continue that as is for now.  She said she's been on that for several months without issues.  Also apparently intolerant to statins.  Procedures Performed  Cardiolite stress test       Consults:   Consults     No orders found from 6/3/2018 to 7/3/2018.          Pertinent Test Results: Cardiolite stress test showed possible small apical infarct but no ischemia.  EF 41% on that study but has been much lower previously    Ejection Fraction  Lab Results   Component Value Date    EF 41 07/03/2018       Condition on Discharge:  Stable    Physical Exam at Discharge    Vital Signs  Temp:  [97.5 °F (36.4 °C)-98.2 °F (36.8 °C)] 97.6 °F (36.4 °C)  Heart Rate:  [75-86] 78  Resp:  [18] 18  BP: (124-140)/(52-75) 139/57  Wt Readings from Last 4 Encounters:   07/04/18 88.7 kg (195 lb 9.6 oz)   10/05/17 92.6 kg (204 lb 3.2 oz)      General Appearance:    Alert, cooperative, in no acute distress   Head:    Normocephalic, without obvious abnormality, atraumatic   Eyes:            Conjunctivae normal, no   icterus   Neck:   No adenopathy, supple, trachea midline, no thyromegaly, no   carotid bruit, no JVD   Lungs:     Clear to auscultation,respirations regular, even and                  unlabored    Heart:     Regular rhythm and normal rate, normal S1 and S2,            2/6 systolic murmur at  the right upper sternal border, no gallop, no rub, no click   Chest Wall:    No abnormalities observed   Abdomen:     Normal bowel sounds, no masses, no organomegaly, soft        non-tender, non-distended, no guarding, no rebound                tenderness   Rectal:     Deferred   Extremities:   No edema. Moves all extremities well, no cyanosis, no erythema   Pulses:   Pulses palpable and equal bilaterally   Skin:   No bleeding, bruising or rash   Neurologic:   Cranial nerves 2 - 12 grossly intact, sensation intact, DTR       present and equal bilaterally          Discharge DispositionHome, her daughter will pick her up and I've asked her to stay with her today until her son returns home from work and he will be there overnight with her.      Discharge Medications     Discharge Medications      ASK your doctor about these medications      Instructions Start Date   apixaban 5 MG tablet tablet  Commonly known as:  ELIQUIS   5 mg, Oral, Every 12 Hours Scheduled, Pt unsure of dose      aspirin 325 MG tablet   325 mg, Oral, Daily      cetirizine 10 MG tablet  Commonly known as:  zyrTEC   10 mg, Oral, Daily      CO Q 10 PO   Oral, Daily      CORLANOR PO   5 mg, Oral, 2 Times Daily      digoxin 125 MCG tablet  Commonly known as:  LANOXIN   125 mcg, Oral, Daily Digoxin      doxepin 25 MG capsule  Commonly known as:  SINEquan   50 mg, Oral, Nightly PRN      ENTRESTO 24-26 MG tablet  Generic drug:  sacubitril-valsartan   1 tablet, Oral, Daily      FLUoxetine 20 MG capsule  Commonly known as:  PROzac   40 mg, Oral, Daily      L-CARNITINE PO   400 mg, Oral, Daily      levothyroxine 150 MCG tablet  Commonly known as:  SYNTHROID, LEVOTHROID   150 mcg, Oral, Daily      liothyronine 25 MCG tablet  Commonly known as:  CYTOMEL   25 mcg, Oral, Daily      nitroglycerin 0.4 MG SL tablet  Commonly known as:  NITROSTAT   0.4 mg, Sublingual, Every 5 Minutes PRN, Take no more than 3 doses in 15 minutes.       omeprazole 20 MG  capsule  Commonly known as:  priLOSEC   20 mg, Oral, Daily      potassium chloride 10 MEQ CR capsule  Commonly known as:  MICRO-K   20 mEq, Oral, 2 Times Daily      torsemide 20 MG tablet  Commonly known as:  DEMADEX   40 mg, Oral, Daily, Patient states she takes 20mg po once every 3 days or when noticeable swelling.             Discharge Diet:  cardiac    Activity at Discharge:  no driving until seen in the office.  She will be advised to stay at home for a couple of days and then try gradually resuming her usual activity.    Follow-up Appointments  See Dr. Watts in our [place] office in one to 2 weeks.  She has been instructed not to drive until she sees him in the office      Test Results at Discharge  Lab Results   Component Value Date    GLUCOSE 100 (H) 07/04/2018    CALCIUM 9.3 07/04/2018     07/04/2018    K 4.1 07/04/2018    CO2 28.4 07/04/2018     07/04/2018    BUN 18 07/04/2018    CREATININE 0.77 07/04/2018    EGFRIFNONA 74 07/04/2018    BCR 23.4 07/04/2018    ANIONGAP 10.6 07/04/2018        Lab Results   Component Value Date    GLUCOSE 100 (H) 07/04/2018    BUN 18 07/04/2018    CREATININE 0.77 07/04/2018    EGFRIFNONA 74 07/04/2018    BCR 23.4 07/04/2018    CO2 28.4 07/04/2018    CALCIUM 9.3 07/04/2018    ALBUMIN 3.70 10/05/2017    LABIL2 1.2 10/05/2017    AST 23 10/05/2017    ALT 37 (H) 10/05/2017        Magnesium   Date Value Ref Range Status   07/04/2018 2.0 1.6 - 2.4 mg/dL Final       Lab Results   Component Value Date    WBC 6.02 07/04/2018    HGB 12.4 07/04/2018    HCT 38.5 07/04/2018    MCV 85.6 07/04/2018     07/04/2018      Lab Results   Component Value Date    CHOL 192 07/03/2018     Lab Results   Component Value Date    HDL 61 (H) 07/03/2018     Lab Results   Component Value Date     (H) 07/03/2018     Lab Results   Component Value Date    TRIG 58 07/03/2018     No components found for: CHOLHDL   Lab Results   Component Value Date    HGBA1C 5.40 07/03/2018      Lab  Results   Component Value Date    TSH 0.016 (L) 07/03/2018           Jorge Yeboah MD  07/04/18  8:17 AM    Time: 38 min

## 2018-07-04 NOTE — PROGRESS NOTES
Case Management Discharge Note    Final Note: Pt dc'd home     Destination     No service has been selected for the patient.      Durable Medical Equipment     No service has been selected for the patient.      Dialysis/Infusion     No service has been selected for the patient.      Home Medical Care     No service has been selected for the patient.      Social Care     No service has been selected for the patient.        Other: Other (private vehicle)    Final Discharge Disposition Code: 01 - home or self-care

## 2018-07-04 NOTE — PLAN OF CARE
Problem: Patient Care Overview  Goal: Plan of Care Review  Outcome: Ongoing (interventions implemented as appropriate)   07/04/18 0400   Coping/Psychosocial   Plan of Care Reviewed With patient   Plan of Care Review   Progress improving   OTHER   Outcome Summary pt vss, no aarrhythmias. some chest pain, no meds taken pt refused. resting well. sleeping pill before bed. resting well. will continue to monitor.      Goal: Individualization and Mutuality  Outcome: Ongoing (interventions implemented as appropriate)    Goal: Discharge Needs Assessment  Outcome: Ongoing (interventions implemented as appropriate)    Goal: Interprofessional Rounds/Family Conf  Outcome: Ongoing (interventions implemented as appropriate)      Problem: Fall Risk (Adult)  Goal: Absence of Fall  Outcome: Ongoing (interventions implemented as appropriate)      Problem: Arrhythmia/Dysrhythmia (Symptomatic) (Adult)  Goal: Signs and Symptoms of Listed Potential Problems Will be Absent, Minimized or Managed (Arrhythmia/Dysrhythmia)  Outcome: Ongoing (interventions implemented as appropriate)      Problem: Pain, Acute (Adult)  Goal: Acceptable Pain Control/Comfort Level  Outcome: Ongoing (interventions implemented as appropriate)      Problem: Skin Injury Risk (Adult)  Goal: Skin Health and Integrity  Outcome: Ongoing (interventions implemented as appropriate)

## 2022-06-15 ENCOUNTER — CONSULT (OUTPATIENT)
Dept: ONCOLOGY | Facility: HOSPITAL | Age: 72
End: 2022-06-15

## 2022-06-15 VITALS
TEMPERATURE: 96.8 F | HEART RATE: 69 BPM | DIASTOLIC BLOOD PRESSURE: 80 MMHG | RESPIRATION RATE: 18 BRPM | SYSTOLIC BLOOD PRESSURE: 146 MMHG | WEIGHT: 231 LBS | OXYGEN SATURATION: 95 % | BODY MASS INDEX: 43.67 KG/M2

## 2022-06-15 DIAGNOSIS — D64.9 ANEMIA, UNSPECIFIED TYPE: ICD-10-CM

## 2022-06-15 DIAGNOSIS — N18.32 STAGE 3B CHRONIC KIDNEY DISEASE: ICD-10-CM

## 2022-06-15 DIAGNOSIS — Z87.891 PERSONAL HISTORY OF NICOTINE DEPENDENCE: ICD-10-CM

## 2022-06-15 DIAGNOSIS — F17.201 NICOTINE DEPENDENCE IN REMISSION, UNSPECIFIED NICOTINE PRODUCT TYPE: Primary | ICD-10-CM

## 2022-06-15 PROBLEM — I48.91 ATRIAL FIBRILLATION: Status: ACTIVE | Noted: 2018-08-01

## 2022-06-15 PROBLEM — Z95.810 BIVENTRICULAR AUTOMATIC IMPLANTABLE CARDIOVERTER DEFIBRILLATOR IN SITU: Status: ACTIVE | Noted: 2017-12-06

## 2022-06-15 PROBLEM — I44.7 LEFT BUNDLE-BRANCH BLOCK: Status: ACTIVE | Noted: 2017-12-12

## 2022-06-15 PROBLEM — G47.10 HYPERSOMNIA: Status: ACTIVE | Noted: 2020-02-20

## 2022-06-15 PROBLEM — I42.0 NONISCHEMIC CONGESTIVE CARDIOMYOPATHY (HCC): Status: ACTIVE | Noted: 2018-09-29

## 2022-06-15 PROCEDURE — 99204 OFFICE O/P NEW MOD 45 MIN: CPT | Performed by: NURSE PRACTITIONER

## 2022-06-15 PROCEDURE — G0463 HOSPITAL OUTPT CLINIC VISIT: HCPCS | Performed by: NURSE PRACTITIONER

## 2022-06-15 NOTE — PROGRESS NOTES
Chief Complaint  Anemia    Love Santa APRN Abramovich, Mark Allan, MD    Records Obtained:  Records of the patients history including those obtained from Epic, patient information were reviewed and summarized in detail.    Reason for referral: anemia    Subjective          Temi Ho presents to [facility] for anemia    History of Present Illness    Ms. Temi Ho is a very pleasant 72 year old  female presenting new patient evaluation for mild anemia. She is a referral from [facility]. She reports worsening fatigue for the last 8 months. Reports she has history of Atrial Fib, pacemaker, hypertension, hypothyroididsm, CAD, mitral valve issues, CKD. She reports she took iron in the past but only for about a month and then stopped. She has never had a colonoscopy or EGD in the past. She is a former smoker who quit about 2-3 years ago and smoke 1 PPD for about 45 years.     Recent lab work done on 5/9//2022: WBC 6.40, hemoglobin 12.40, MCV 94, platelet count of 232. Iron 42, T sat of 14%. GFR 40, Creatinine of 1.30. Calcium normal.         Oncology/Hematology History    No history exists.       Review of Systems   Constitutional: Positive for fatigue.   Cardiovascular: Positive for chest pain and leg swelling (ble).   Endocrine: Positive for cold intolerance.   All other systems reviewed and are negative.      Current Outpatient Medications on File Prior to Visit   Medication Sig Dispense Refill   • aspirin 325 MG tablet Take 325 mg by mouth Daily.     • Amino Acids (L-CARNITINE PO) Take 400 mg by mouth Daily.     • amiodarone (PACERONE) 200 MG tablet Take 1 tablet by mouth Every 12 (Twelve) Hours. 60 tablet 1   • apixaban (ELIQUIS) 5 MG tablet tablet Take 5 mg by mouth Every 12 (Twelve) Hours. Pt unsure of dose      • cetirizine (zyrTEC) 10 MG tablet Take 10 mg by mouth Daily.     • Coenzyme Q10 (CO Q 10 PO) Take  by mouth Daily.     • digoxin  "(LANOXIN) 125 MCG tablet Take 125 mcg by mouth Daily.     • doxepin (SINEquan) 25 MG capsule Take 50 mg by mouth At Night As Needed for Sleep.     • FLUoxetine (PROzac) 20 MG capsule Take 40 mg by mouth Daily.     • Ivabradine HCl (CORLANOR PO) Take 5 mg by mouth 2 (Two) Times a Day.     • levothyroxine (SYNTHROID, LEVOTHROID) 150 MCG tablet Take 150 mcg by mouth Daily.     • liothyronine (CYTOMEL) 25 MCG tablet Take 25 mcg by mouth Daily.     • nitroglycerin (NITROSTAT) 0.4 MG SL tablet Place 0.4 mg under the tongue Every 5 (Five) Minutes As Needed for Chest Pain. Take no more than 3 doses in 15 minutes.     • potassium chloride (MICRO-K) 10 MEQ CR capsule Take 20 mEq by mouth 2 (Two) Times a Day.     • sacubitril-valsartan (ENTRESTO) 24-26 MG tablet Take 1 tablet by mouth Daily.     • torsemide (DEMADEX) 20 MG tablet Take 1 tablet by mouth Daily. Patient states she takes 20mg po once every 3 days or when noticeable swelling. 30 tablet 3     No current facility-administered medications on file prior to visit.       Allergies   Allergen Reactions   • Ace Inhibitors Angioedema   • Coreg [Carvedilol] Other (See Comments)     Numbness in fingers, palms, toes   • Iodine Angioedema   • Metoprolol Swelling     \"Tongue Swelling and Rash\"   • Spironolactone Other (See Comments)     Weakness   • Statins Other (See Comments)     Weakness, frozen shoulders   • Aleve [Naproxen Sodium] Rash   • Amoxicillin Rash   • Latex Rash   • Tylenol [Acetaminophen] Rash     Past Medical History:   Diagnosis Date   • Arthritis    • Atrial fibrillation (HCC)    • BBB (bundle branch block)    • CHF (congestive heart failure) (HCC)    • COPD (chronic obstructive pulmonary disease) (HCC)    • Diabetes mellitus (HCC)    • Disease of thyroid gland    • GERD (gastroesophageal reflux disease)    • Hyperlipidemia    • Hypertension    • IBS (irritable bowel syndrome)    • Obesity      Past Surgical History:   Procedure Laterality Date   • BILATERAL " OOPHORECTOMY     • CARDIAC ELECTROPHYSIOLOGY PROCEDURE N/A 10/5/2017    Procedure: Implant ICD - bi ventricular;  Surgeon: Yash Watts MD;  Location: [facility] INVASIVE LOCATION;  Service:    • CHOLECYSTECTOMY     • HYSTERECTOMY     • JOINT REPLACEMENT      bilateral knees     Social History     Socioeconomic History   • Marital status:    Tobacco Use   • Smoking status: Former Smoker     Packs/day: 2.50     Years: 45.00     Pack years: 112.50     Types: Cigarettes     Start date: 1962     Quit date: 2007     Years since quitting: 15.4   • Smokeless tobacco: Never Used   Substance and Sexual Activity   • Alcohol use: Yes     Alcohol/week: 1.0 standard drink     Types: 1 Glasses of wine per week     Comment: rarely   • Drug use: No     Comment: used cannabis oil as bronchodilator for a few days a couple weeks ago, but nothing since.   • Sexual activity: Defer     Family History   Problem Relation Age of Onset   • Heart disease Mother    • Cancer Mother    • Hypertension Mother    • Heart disease Father    • Hypertension Father    • Aortic aneurysm Sister      Immunization History   Administered Date(s) Administered   • COVID-19 (MODERNA) 1st, 2nd, 3rd Dose Only 04/27/2021, 12/04/2021   • Fluzone High-Dose 65+yrs 11/21/2020   • Influenza Quad Vaccine (Inpatient) 10/06/2017       Objective   Physical Exam  Vitals and nursing note reviewed.   Constitutional:       Appearance: She is obese.   HENT:      Nose: Nose normal.      Mouth/Throat:      Mouth: Mucous membranes are moist.   Eyes:      Pupils: Pupils are equal, round, and reactive to light.   Cardiovascular:      Rate and Rhythm: Normal rate and regular rhythm.      Pulses: Normal pulses.      Heart sounds: Normal heart sounds.   Pulmonary:      Effort: Pulmonary effort is normal.      Breath sounds: Normal breath sounds.   Abdominal:      Palpations: Abdomen is soft.   Musculoskeletal:         General: Normal range of motion.      Cervical back:  Normal range of motion and neck supple.   Skin:     General: Skin is warm and dry.      Capillary Refill: Capillary refill takes less than 2 seconds.   Neurological:      Mental Status: She is alert and oriented to person, place, and time.      Motor: No weakness.   Psychiatric:         Mood and Affect: Mood normal.         Behavior: Behavior normal.         Thought Content: Thought content normal.         Vitals:    06/15/22 1523   BP: 146/80   Pulse: 69   Resp: 18   Temp: 96.8 °F (36 °C)   SpO2: 95%   Weight: 105 kg (231 lb)   PainSc: 0-No pain     ECOG score: 1         ECOG: (1) Restricted in Physically Strenuous Activity, Ambulatory & Able to Do Work of Light Nature  Fall Risk Assessment was completed, and patient is at low risk for falls.  PHQ-9 Total Score: 0       The patient is  experiencing fatigue. Fatigue score: 10    PT/OT Functional Screening: PT fx screen: No needs identified  Speech Functional Screening: Speech fx screen: No needs identified  Rehab to be ordered: Rehab to be ordered: No needs identified        Result Review :   The following data was reviewed by: BEN Dutta on 06/15/2022:  Lab Results   Component Value Date    HGB 12.4 07/04/2018    HCT 38.5 07/04/2018    MCV 85.6 07/04/2018     07/04/2018    WBC 6.02 07/04/2018    NEUTROABS 3.65 07/04/2018    LYMPHSABS 1.64 07/04/2018    MONOSABS 0.47 07/04/2018    EOSABS 0.23 07/04/2018    BASOSABS 0.03 07/04/2018     Lab Results   Component Value Date    GLUCOSE 100 (H) 07/04/2018    BUN 18 07/04/2018    CREATININE 0.77 07/04/2018     07/04/2018    K 4.1 07/04/2018     07/04/2018    CO2 28.4 07/04/2018    CALCIUM 9.3 07/04/2018    PROTEINTOT 6.8 10/05/2017    ALBUMIN 3.70 10/05/2017    BILITOT 0.6 10/05/2017    ALKPHOS 109 10/05/2017    AST 23 10/05/2017    ALT 37 (H) 10/05/2017          Assessment and Plan    Diagnoses and all orders for this visit:    1. Nicotine dependence in remission, unspecified nicotine  product type (Primary)  -     CT Chest Low Dose Wo; Future    2. Anemia, unspecified type  -     CBC & Differential; Future  -     Comprehensive Metabolic Panel; Future  -     Iron Profile; Future  -     Ferritin; Future  -     Peripheral Blood Smear; Future  -     Protein Elec + Interp, Serum; Future  -     IgG; Future  -     IgM; Future  -     IgA; Future  -     Immunoglobulin Free LT Chains Blood; Future  -     DAVID+Protein Electro, 24-Hr Urine - Urine, Clean Catch; Future  -     Occult Blood X 1, Stool - Stool, Per Rectum; Future    3. Stage 3b chronic kidney disease (HCC)  -     Protein Elec + Interp, Serum; Future  -     IgG; Future  -     IgM; Future  -     IgA; Future  -     Immunoglobulin Free LT Chains Blood; Future  -     DAVID+Protein Electro, 24-Hr Urine - Urine, Clean Catch; Future    4. Personal history of nicotine dependence   -     CT Chest Low Dose Wo; Future    Recheck labwok today with CBC, iron studies, ferritin. Will also check SPEP, UPEP, SFLC, QUIGS to evalute MGUS. Will also check stool for occult blood. If positive, she would need a GI evaluation.     Likely, mild anemia is secondary to chronic disease including   CAD, CKD and other chronic illness.     She is a former smoker. I do not see a current low dose CT in [facility]. Will order low dose CT scan to rule out any lung cancer or pulmonary nodules.     I spent 25 minutes caring for Temi on this date of service. This time includes time spent by me in the following activities:preparing for the visit, reviewing tests, obtaining and/or reviewing a separately obtained history, performing a medically appropriate examination and/or evaluation , counseling and educating the patient/family/caregiver, ordering medications, tests, or procedures, referring and communicating with other health care professionals , documenting information in the medical record, independently interpreting results and communicating that information with the  patient/family/caregiver and care coordination     Patient Follow Up: 1 month with NP.     Patient was given instructions and counseling regarding her condition or for health maintenance advice. Please see specific information pulled into the AVS if appropriate.     Dana Hairston, APRN    6/15/2022

## 2022-06-29 ENCOUNTER — HOSPITAL ENCOUNTER (OUTPATIENT)
Dept: CT IMAGING | Facility: HOSPITAL | Age: 72
Discharge: HOME | End: 2022-06-29

## 2022-06-29 ENCOUNTER — LAB (OUTPATIENT)
Dept: LAB | Facility: HOSPITAL | Age: 72
End: 2022-06-29

## 2022-06-29 DIAGNOSIS — Z87.891 PERSONAL HISTORY OF NICOTINE DEPENDENCE: ICD-10-CM

## 2022-06-29 DIAGNOSIS — N18.32 STAGE 3B CHRONIC KIDNEY DISEASE: ICD-10-CM

## 2022-06-29 DIAGNOSIS — D64.9 ANEMIA, UNSPECIFIED TYPE: Primary | ICD-10-CM

## 2022-06-29 DIAGNOSIS — F17.201 NICOTINE DEPENDENCE IN REMISSION, UNSPECIFIED NICOTINE PRODUCT TYPE: ICD-10-CM

## 2022-06-29 LAB
ALBUMIN SERPL-MCNC: 4.1 G/DL (ref 3.5–5.2)
ALBUMIN/GLOB SERPL: 1.4 G/DL
ALP SERPL-CCNC: 132 U/L (ref 39–117)
ALT SERPL W P-5'-P-CCNC: 45 U/L (ref 1–33)
ANION GAP SERPL CALCULATED.3IONS-SCNC: 9.3 MMOL/L (ref 5–15)
AST SERPL-CCNC: 35 U/L (ref 1–32)
BASOPHILS # BLD AUTO: 0.05 10*3/MM3 (ref 0–0.2)
BASOPHILS NFR BLD AUTO: 0.7 % (ref 0–1.5)
BILIRUB SERPL-MCNC: 0.3 MG/DL (ref 0–1.2)
BUN SERPL-MCNC: 30 MG/DL (ref 8–23)
BUN/CREAT SERPL: 22.2 (ref 7–25)
CALCIUM SPEC-SCNC: 9.8 MG/DL (ref 8.6–10.5)
CHLORIDE SERPL-SCNC: 102 MMOL/L (ref 98–107)
CO2 SERPL-SCNC: 28.7 MMOL/L (ref 22–29)
CREAT SERPL-MCNC: 1.35 MG/DL (ref 0.57–1)
DEPRECATED RDW RBC AUTO: 49.4 FL (ref 37–54)
EGFRCR SERPLBLD CKD-EPI 2021: 41.3 ML/MIN/1.73
EOSINOPHIL # BLD AUTO: 0.1 10*3/MM3 (ref 0–0.4)
EOSINOPHIL # BLD MANUAL: 0.14 10*3/MM3 (ref 0–0.4)
EOSINOPHIL NFR BLD AUTO: 1.4 % (ref 0.3–6.2)
EOSINOPHIL NFR BLD MANUAL: 2 % (ref 0.3–6.2)
ERYTHROCYTE [DISTWIDTH] IN BLOOD BY AUTOMATED COUNT: 15.4 % (ref 12.3–15.4)
FERRITIN SERPL-MCNC: 186.4 NG/ML (ref 13–150)
GLOBULIN UR ELPH-MCNC: 2.9 GM/DL
GLUCOSE SERPL-MCNC: 146 MG/DL (ref 65–99)
HCT VFR BLD AUTO: 38 % (ref 34–46.6)
HEMOCCULT STL QL IA: NEGATIVE
HGB BLD-MCNC: 12 G/DL (ref 12–15.9)
IRON 24H UR-MRATE: 35 MCG/DL (ref 37–145)
IRON SATN MFR SERPL: 11 % (ref 20–50)
LYMPHOCYTES # BLD AUTO: 0.76 10*3/MM3 (ref 0.7–3.1)
LYMPHOCYTES # BLD MANUAL: 1.47 10*3/MM3 (ref 0.7–3.1)
LYMPHOCYTES NFR BLD AUTO: 10.8 % (ref 19.6–45.3)
LYMPHOCYTES NFR BLD MANUAL: 4 % (ref 5–12)
MCH RBC QN AUTO: 28 PG (ref 26.6–33)
MCHC RBC AUTO-ENTMCNC: 31.6 G/DL (ref 31.5–35.7)
MCV RBC AUTO: 88.6 FL (ref 79–97)
MONOCYTES # BLD AUTO: 0.46 10*3/MM3 (ref 0.1–0.9)
MONOCYTES # BLD: 0.28 10*3/MM3 (ref 0.1–0.9)
MONOCYTES NFR BLD AUTO: 6.6 % (ref 5–12)
NEUTROPHILS # BLD AUTO: 5.12 10*3/MM3 (ref 1.7–7)
NEUTROPHILS NFR BLD AUTO: 5.63 10*3/MM3 (ref 1.7–7)
NEUTROPHILS NFR BLD AUTO: 80.2 % (ref 42.7–76)
NEUTROPHILS NFR BLD MANUAL: 73 % (ref 42.7–76)
PATHOLOGY REVIEW: YES
PLAT MORPH BLD: NORMAL
PLATELET # BLD AUTO: 226 10*3/MM3 (ref 140–450)
PMV BLD AUTO: 11 FL (ref 6–12)
POTASSIUM SERPL-SCNC: 4.3 MMOL/L (ref 3.5–5.2)
PROT SERPL-MCNC: 7 G/DL (ref 6–8.5)
RBC # BLD AUTO: 4.29 10*6/MM3 (ref 3.77–5.28)
RBC MORPH BLD: NORMAL
SODIUM SERPL-SCNC: 140 MMOL/L (ref 136–145)
TIBC SERPL-MCNC: 310 MCG/DL (ref 298–536)
TRANSFERRIN SERPL-MCNC: 208 MG/DL (ref 200–360)
VARIANT LYMPHS NFR BLD MANUAL: 21 % (ref 19.6–45.3)
WBC MORPH BLD: NORMAL
WBC NRBC COR # BLD: 7.02 10*3/MM3 (ref 3.4–10.8)

## 2022-06-29 PROCEDURE — 83521 IG LIGHT CHAINS FREE EACH: CPT

## 2022-06-29 PROCEDURE — 82784 ASSAY IGA/IGD/IGG/IGM EACH: CPT

## 2022-06-29 PROCEDURE — 84466 ASSAY OF TRANSFERRIN: CPT

## 2022-06-29 PROCEDURE — 36415 COLL VENOUS BLD VENIPUNCTURE: CPT

## 2022-06-29 PROCEDURE — 82728 ASSAY OF FERRITIN: CPT

## 2022-06-29 PROCEDURE — 80053 COMPREHEN METABOLIC PANEL: CPT

## 2022-06-29 PROCEDURE — 85007 BL SMEAR W/DIFF WBC COUNT: CPT

## 2022-06-29 PROCEDURE — 84165 PROTEIN E-PHORESIS SERUM: CPT

## 2022-06-29 PROCEDURE — 85025 COMPLETE CBC W/AUTO DIFF WBC: CPT

## 2022-06-29 PROCEDURE — 83540 ASSAY OF IRON: CPT

## 2022-06-29 PROCEDURE — 71271 CT THORAX LUNG CANCER SCR C-: CPT

## 2022-06-29 PROCEDURE — 82274 ASSAY TEST FOR BLOOD FECAL: CPT

## 2022-06-30 LAB
IGA1 MFR SER: 278 MG/DL (ref 70–400)
IGG1 SER-MCNC: 876 MG/DL (ref 700–1600)
IGM SERPL-MCNC: 92 MG/DL (ref 40–230)

## 2022-07-01 ENCOUNTER — LAB (OUTPATIENT)
Dept: LAB | Facility: HOSPITAL | Age: 72
End: 2022-07-01

## 2022-07-01 DIAGNOSIS — D64.9 ANEMIA, UNSPECIFIED TYPE: ICD-10-CM

## 2022-07-01 DIAGNOSIS — N18.32 STAGE 3B CHRONIC KIDNEY DISEASE: ICD-10-CM

## 2022-07-01 LAB
ALBUMIN SERPL ELPH-MCNC: 3.3 G/DL (ref 2.9–4.4)
ALBUMIN/GLOB SERPL: 1.1 {RATIO} (ref 0.7–1.7)
ALPHA1 GLOB SERPL ELPH-MCNC: 0.3 G/DL (ref 0–0.4)
ALPHA2 GLOB SERPL ELPH-MCNC: 0.9 G/DL (ref 0.4–1)
B-GLOBULIN SERPL ELPH-MCNC: 1.1 G/DL (ref 0.7–1.3)
CYTO UR: NORMAL
GAMMA GLOB SERPL ELPH-MCNC: 0.7 G/DL (ref 0.4–1.8)
GLOBULIN SER CALC-MCNC: 3 G/DL (ref 2.2–3.9)
KAPPA LC FREE SER-MCNC: 53.1 MG/L (ref 3.3–19.4)
KAPPA LC FREE/LAMBDA FREE SER: 1.45 {RATIO} (ref 0.26–1.65)
LAB AP CASE REPORT: NORMAL
LAB AP CLINICAL INFORMATION: NORMAL
LABORATORY COMMENT REPORT: NORMAL
LAMBDA LC FREE SERPL-MCNC: 36.6 MG/L (ref 5.7–26.3)
M PROTEIN SERPL ELPH-MCNC: NORMAL G/DL
PATH REPORT.FINAL DX SPEC: NORMAL
PATH REPORT.GROSS SPEC: NORMAL
PROT PATTERN SERPL ELPH-IMP: NORMAL
PROT SERPL-MCNC: 6.3 G/DL (ref 6–8.5)

## 2022-07-01 PROCEDURE — 81050 URINALYSIS VOLUME MEASURE: CPT

## 2022-07-01 PROCEDURE — 86335 IMMUNFIX E-PHORSIS/URINE/CSF: CPT

## 2022-07-01 PROCEDURE — 84166 PROTEIN E-PHORESIS/URINE/CSF: CPT

## 2022-07-01 PROCEDURE — 84156 ASSAY OF PROTEIN URINE: CPT

## 2022-07-06 LAB
ALBUMIN 24H MFR UR ELPH: 48.8 %
ALPHA1 GLOB 24H MFR UR ELPH: 6.9 %
ALPHA2 GLOB 24H MFR UR ELPH: 3.6 %
B-GLOBULIN MFR UR ELPH: 10.9 %
GAMMA GLOB 24H MFR UR ELPH: 29.8 %
HIV 1 & 2 AB SER-IMP: NORMAL
INTERPRETATION UR IFE-IMP: NORMAL
M PROTEIN 24H MFR UR ELPH: NORMAL %
PROT 24H UR-MRATE: 130 MG/24 HR (ref 30–150)
PROT UR-MCNC: 4.8 MG/DL

## 2022-07-19 ENCOUNTER — OFFICE VISIT (OUTPATIENT)
Dept: ONCOLOGY | Facility: HOSPITAL | Age: 72
End: 2022-07-19

## 2022-07-19 VITALS
SYSTOLIC BLOOD PRESSURE: 142 MMHG | BODY MASS INDEX: 44.64 KG/M2 | DIASTOLIC BLOOD PRESSURE: 73 MMHG | OXYGEN SATURATION: 96 % | TEMPERATURE: 98.1 F | RESPIRATION RATE: 16 BRPM | HEART RATE: 73 BPM | WEIGHT: 236.11 LBS

## 2022-07-19 DIAGNOSIS — M25.541 ARTHRALGIA OF BOTH HANDS: ICD-10-CM

## 2022-07-19 DIAGNOSIS — M25.542 ARTHRALGIA OF BOTH HANDS: ICD-10-CM

## 2022-07-19 DIAGNOSIS — D64.9 ANEMIA, UNSPECIFIED TYPE: Primary | ICD-10-CM

## 2022-07-19 PROCEDURE — G0463 HOSPITAL OUTPT CLINIC VISIT: HCPCS | Performed by: NURSE PRACTITIONER

## 2022-07-19 PROCEDURE — 99213 OFFICE O/P EST LOW 20 MIN: CPT | Performed by: NURSE PRACTITIONER

## 2022-07-19 NOTE — PROGRESS NOTES
Chief Complaint  Anemia    Dana Hairston, *  Dash Santos MD      Subjective          Temi Ho presents to [facility] for mild anemia.     History of Present Illness   Ms. Temi Ho was seen previously in consulation in mid June of 2022. For anemia as referral from [place]. History of chronic disease and a previous smoker of 45 years. She reports she is not taking iron supplements currently, but has taken them in the past and reports she tolerated well.     I will put her on oral iron 2 tablets QD. If she can not tolerate oral iron, then decrease to 1 time a day.   We discussed the rest of her her labs as well.   Iron of 35, iron sat 11%. Creatinine of 1.35 with gfr of 41. ALT 45, AST of 35. SPEP and UPEP with no monoclonality. She does have mildly elevated kappa and lambda rising together which is no cause for concern at this time and may be secondary to polytypic / polyclonal. Occult blood test was negative for any acute blood loss.   She does report bilateral wrist pain and will rule out any rheumatological disorders with lab prior to the next visit.       Oncology/Hematology History    No history exists.       Review of Systems   Constitutional: Positive for fatigue. Negative for appetite change, diaphoresis, fever, unexpected weight gain and unexpected weight loss.   HENT: Negative for hearing loss, sore throat and voice change.    Eyes: Negative for blurred vision, double vision, pain, redness and visual disturbance.   Respiratory: Negative for cough, shortness of breath and wheezing.    Cardiovascular: Negative for chest pain, palpitations and leg swelling.   Endocrine: Negative for cold intolerance, heat intolerance, polydipsia and polyuria.   Genitourinary: Negative for decreased urine volume, difficulty urinating, frequency and urinary incontinence.   Musculoskeletal: Negative for arthralgias, back pain, joint swelling and myalgias.    Skin: Negative for color change, rash, skin lesions and wound.   Neurological: Negative for dizziness, seizures, numbness and headache.   Hematological: Negative for adenopathy. Does not bruise/bleed easily.   Psychiatric/Behavioral: Negative for depressed mood. The patient is not nervous/anxious.    All other systems reviewed and are negative.      Current Outpatient Medications on File Prior to Visit   Medication Sig Dispense Refill   • Amino Acids (L-CARNITINE PO) Take 400 mg by mouth Daily.     • amiodarone (PACERONE) 200 MG tablet Take 1 tablet by mouth Every 12 (Twelve) Hours. 60 tablet 1   • apixaban (ELIQUIS) 5 MG tablet tablet Take 5 mg by mouth Every 12 (Twelve) Hours. Pt unsure of dose     • aspirin 325 MG tablet Take 325 mg by mouth Daily.     • cetirizine (zyrTEC) 10 MG tablet Take 10 mg by mouth Daily.     • Coenzyme Q10 (CO Q 10 PO) Take  by mouth Daily.     • digoxin (LANOXIN) 125 MCG tablet Take 125 mcg by mouth Daily.     • doxepin (SINEquan) 25 MG capsule Take 50 mg by mouth At Night As Needed for Sleep.     • FLUoxetine (PROzac) 20 MG capsule Take 40 mg by mouth Daily.     • Ivabradine HCl (CORLANOR PO) Take 5 mg by mouth 2 (Two) Times a Day.     • levothyroxine (SYNTHROID, LEVOTHROID) 150 MCG tablet Take 150 mcg by mouth Daily.     • nitroglycerin (NITROSTAT) 0.4 MG SL tablet Place 0.4 mg under the tongue Every 5 (Five) Minutes As Needed for Chest Pain. Take no more than 3 doses in 15 minutes.     • potassium chloride (MICRO-K) 10 MEQ CR capsule Take 20 mEq by mouth 2 (Two) Times a Day.     • sacubitril-valsartan (ENTRESTO) 24-26 MG tablet Take 1 tablet by mouth Daily.     • torsemide (DEMADEX) 20 MG tablet Take 1 tablet by mouth Daily. Patient states she takes 20mg po once every 3 days or when noticeable swelling. 30 tablet 3   • [DISCONTINUED] liothyronine (CYTOMEL) 25 MCG tablet Take 25 mcg by mouth Daily.       No current facility-administered medications on file prior to visit.  "      Allergies   Allergen Reactions   • Levofloxacin Rash   • Ace Inhibitors Angioedema   • Coreg [Carvedilol] Other (See Comments)     Numbness in fingers, palms, toes   • Iodine Angioedema   • Metoprolol Swelling     \"Tongue Swelling and Rash\"   • Spironolactone Other (See Comments)     Weakness   • Statins Other (See Comments)     Weakness, frozen shoulders   • Aleve [Naproxen Sodium] Rash   • Amoxicillin Rash   • Latex Rash   • Tylenol [Acetaminophen] Rash     Past Medical History:   Diagnosis Date   • Arthritis    • Atrial fibrillation (HCC)    • BBB (bundle branch block)    • CHF (congestive heart failure) (HCC)    • COPD (chronic obstructive pulmonary disease) (HCC)    • Diabetes mellitus (HCC)    • Disease of thyroid gland    • GERD (gastroesophageal reflux disease)    • Hyperlipidemia    • Hypertension    • IBS (irritable bowel syndrome)    • Obesity      Past Surgical History:   Procedure Laterality Date   • BILATERAL OOPHORECTOMY     • CARDIAC ELECTROPHYSIOLOGY PROCEDURE N/A 10/5/2017    Procedure: Implant ICD - bi ventricular;  Surgeon: Yash Watts MD;  Location: [facility] INVASIVE LOCATION;  Service:    • CHOLECYSTECTOMY     • HYSTERECTOMY     • JOINT REPLACEMENT      bilateral knees     Social History     Socioeconomic History   • Marital status:    Tobacco Use   • Smoking status: Former Smoker     Packs/day: 2.50     Years: 45.00     Pack years: 112.50     Types: Cigarettes     Start date: 1962     Quit date: 2007     Years since quitting: 15.5   • Smokeless tobacco: Never Used   Substance and Sexual Activity   • Alcohol use: Yes     Alcohol/week: 1.0 standard drink     Types: 1 Glasses of wine per week     Comment: rarely   • Drug use: No     Comment: used cannabis oil as bronchodilator for a few days a couple weeks ago, but nothing since.   • Sexual activity: Defer     Family History   Problem Relation Age of Onset   • Heart disease Mother    • Cancer Mother    • Hypertension " Mother    • Heart disease Father    • Hypertension Father    • Aortic aneurysm Sister      Immunization History   Administered Date(s) Administered   • COVID-19 (MODERNA) 1st, 2nd, 3rd Dose Only 04/27/2021, 12/04/2021   • Fluzone High-Dose 65+yrs 11/21/2020   • Influenza Quad Vaccine (Inpatient) 10/06/2017       Objective   Physical Exam  Vitals and nursing note reviewed.   Constitutional:       Appearance: Normal appearance. She is normal weight.   HENT:      Head: Normocephalic.      Nose: Nose normal.      Mouth/Throat:      Mouth: Mucous membranes are moist.   Eyes:      Pupils: Pupils are equal, round, and reactive to light.   Cardiovascular:      Rate and Rhythm: Normal rate and regular rhythm.      Pulses: Normal pulses.      Heart sounds: Normal heart sounds.   Pulmonary:      Effort: Pulmonary effort is normal.      Breath sounds: Normal breath sounds.   Abdominal:      Palpations: Abdomen is soft.   Musculoskeletal:         General: Normal range of motion.      Cervical back: Normal range of motion and neck supple.   Skin:     General: Skin is warm.      Capillary Refill: Capillary refill takes less than 2 seconds.   Neurological:      General: No focal deficit present.      Mental Status: She is alert and oriented to person, place, and time.   Psychiatric:         Mood and Affect: Mood normal.         Behavior: Behavior normal.         Thought Content: Thought content normal.         Judgment: Judgment normal.         Vitals:    07/19/22 1319   BP: 142/73   Pulse: 73   Resp: 16   Temp: 98.1 °F (36.7 °C)   SpO2: 96%   Weight: 107 kg (236 lb 1.8 oz)   PainSc:   5   PainLoc: Generalized     ECOG score: 1         ECOG: (0) Fully Active - Able to Carry On All Pre-disease Performance Without Restriction  Fall Risk Assessment was completed, and patient is at low risk for falls.  PHQ-9 Total Score:         The patient is  experiencing fatigue. Fatigue score: 9    PT/OT Functional Screening: PT fx screen: No needs  identified  Speech Functional Screening: Speech fx screen: No needs identified  Rehab to be ordered: Rehab to be ordered: No needs identified        Result Review :   The following data was reviewed by: BEN Dutta on 07/19/2022:  Lab Results   Component Value Date    HGB 12.0 06/29/2022    HCT 38.0 06/29/2022    MCV 88.6 06/29/2022     06/29/2022    WBC 7.02 06/29/2022    NEUTROABS 5.63 06/29/2022    NEUTROABS 5.12 06/29/2022    LYMPHSABS 0.76 06/29/2022    MONOSABS 0.46 06/29/2022    EOSABS 0.10 06/29/2022    EOSABS 0.14 06/29/2022    BASOSABS 0.05 06/29/2022     Lab Results   Component Value Date    GLUCOSE 146 (H) 06/29/2022    BUN 30 (H) 06/29/2022    CREATININE 1.35 (H) 06/29/2022     06/29/2022    K 4.3 06/29/2022     06/29/2022    CO2 28.7 06/29/2022    CALCIUM 9.8 06/29/2022    PROTEINTOT 7.0 06/29/2022    ALBUMIN 4.10 06/29/2022    ALBUMIN 3.3 06/29/2022    BILITOT 0.3 06/29/2022    ALKPHOS 132 (H) 06/29/2022    AST 35 (H) 06/29/2022    ALT 45 (H) 06/29/2022          Assessment and Plan    Diagnoses and all orders for this visit:    1. Anemia, unspecified type (Primary)  -     CBC & Differential; Future  -     Iron Profile; Future  -     Ferritin; Future  -     Folate; Future  -     Vitamin B12; Future    2. Arthralgia of both hands  -     RENATE; Future  -     Rheumatoid Factor, Quant; Future      Start oral iron 2 tabs daily. She will  OTC iron tablet to start.     Recheck lab work in 3  months and follow up with Dr. Lugo for OV and lab review.       Patient Follow Up: 3 months with Dr. Lugo.     Patient was given instructions and counseling regarding her condition or for health maintenance advice. Please see specific information pulled into the AVS if appropriate.     BEN Dutta    7/19/2022

## 2022-10-18 ENCOUNTER — APPOINTMENT (OUTPATIENT)
Dept: ONCOLOGY | Facility: HOSPITAL | Age: 72
End: 2022-10-18

## 2022-12-28 ENCOUNTER — TELEPHONE (OUTPATIENT)
Dept: ONCOLOGY | Facility: HOSPITAL | Age: 72
End: 2022-12-28

## 2022-12-28 NOTE — TELEPHONE ENCOUNTER
Called pt with new appt date. F/u is on 1-18 at 3:30. Pt will need to get labs before appt. Hub to tell.

## 2023-02-08 ENCOUNTER — TELEPHONE (OUTPATIENT)
Dept: ONCOLOGY | Facility: HOSPITAL | Age: 73
End: 2023-02-08
Payer: MEDICARE

## 2023-02-08 NOTE — TELEPHONE ENCOUNTER
Called pt with f/u info 2-17-23 at 11:45, pt will also need to get labs before appt. Hub to tell.

## 2024-05-03 ENCOUNTER — HOSPITAL ENCOUNTER (EMERGENCY)
Facility: HOSPITAL | Age: 74
Discharge: HOME | End: 2024-05-03
Attending: EMERGENCY MEDICINE
Payer: MEDICARE

## 2024-05-03 ENCOUNTER — APPOINTMENT (OUTPATIENT)
Dept: CT IMAGING | Facility: HOSPITAL | Age: 74
End: 2024-05-03
Payer: MEDICARE

## 2024-05-03 VITALS
BODY MASS INDEX: 38.05 KG/M2 | OXYGEN SATURATION: 95 % | SYSTOLIC BLOOD PRESSURE: 136 MMHG | TEMPERATURE: 98 F | DIASTOLIC BLOOD PRESSURE: 60 MMHG | WEIGHT: 193.78 LBS | HEART RATE: 60 BPM | RESPIRATION RATE: 16 BRPM | HEIGHT: 60 IN

## 2024-05-03 DIAGNOSIS — S06.0XAA CONCUSSION WITH UNKNOWN LOSS OF CONSCIOUSNESS STATUS, INITIAL ENCOUNTER: Primary | ICD-10-CM

## 2024-05-03 PROCEDURE — 70450 CT HEAD/BRAIN W/O DYE: CPT

## 2024-05-03 PROCEDURE — 99284 EMERGENCY DEPT VISIT MOD MDM: CPT

## 2024-05-03 NOTE — ED PROVIDER NOTES
"Time: 12:07 PM EDT  Date of encounter:  5/3/2024  Independent Historian/Clinical History and Information was obtained by:   Patient    History is limited by: N/A    Chief Complaint   Patient presents with    Headache         History of Present Illness:  Patient is a 74 y.o. year old female who presents to the emergency department for evaluation of headache. States that she's had a headache since her accident. Pt fell down 9 flights of stairs on 4/23/24. She was evaluated in [place]. She is wearing a c-collar. Reports no radiating pain to her hands. Right side of her head is worse than left. Denies any vision changes. She took a percocet around 9am today. (Triage Provider: Dash Gastelum PA-C).    Patient is a 74-year-old female who presents with complaints of a headache as well as facial pain.  States that she fell down stairs roughly 10 days ago.  Was diagnosed with a C2 fracture.  Currently on nursing home.  Wearing a c-collar.  States that she just felt more pressure in her head and has had continued headache since the accident.      Patient Care Team  Primary Care Provider: Dash Santos MD    Past Medical History:     Allergies   Allergen Reactions    Levofloxacin Rash    Ace Inhibitors Angioedema    Coreg [Carvedilol] Other (See Comments)     Numbness in fingers, palms, toes    Fentanyl GI Intolerance    Iodine Angioedema    Metoprolol Swelling     \"Tongue Swelling and Rash\"    Spironolactone Other (See Comments)     Weakness    Statins Other (See Comments)     Weakness, frozen shoulders    Aleve [Naproxen Sodium] Rash    Amoxicillin Rash    Latex Rash    Tylenol [Acetaminophen] Rash     Past Medical History:   Diagnosis Date    Arthritis     Atrial fibrillation     BBB (bundle branch block)     CHF (congestive heart failure)     COPD (chronic obstructive pulmonary disease)     Diabetes mellitus     Disease of thyroid gland     GERD (gastroesophageal reflux disease)     Hyperlipidemia     " Hypertension     IBS (irritable bowel syndrome)     Obesity      Past Surgical History:   Procedure Laterality Date    BILATERAL OOPHORECTOMY      CARDIAC ELECTROPHYSIOLOGY PROCEDURE N/A 10/5/2017    Procedure: Implant ICD - bi ventricular;  Surgeon: Yash Watts MD;  Location: [facility] INVASIVE LOCATION;  Service:     CHOLECYSTECTOMY      HYSTERECTOMY      JOINT REPLACEMENT      bilateral knees     Family History   Problem Relation Age of Onset    Heart disease Mother     Cancer Mother     Hypertension Mother     Heart disease Father     Hypertension Father     Aortic aneurysm Sister        Home Medications:  Prior to Admission medications    Medication Sig Start Date End Date Taking? Authorizing Provider   Amino Acids (L-CARNITINE PO) Take 400 mg by mouth Daily.    Isidro Garza MD   amiodarone (PACERONE) 200 MG tablet Take 1 tablet by mouth Every 12 (Twelve) Hours. 7/4/18   Jorge Yeboah MD   apixaban (ELIQUIS) 5 MG tablet tablet Take 5 mg by mouth Every 12 (Twelve) Hours. Pt unsure of dose    Isidro Graza MD   aspirin 325 MG tablet Take 325 mg by mouth Daily.    Isidro Garza MD   cetirizine (zyrTEC) 10 MG tablet Take 10 mg by mouth Daily.    Isidro Garza MD   Coenzyme Q10 (CO Q 10 PO) Take  by mouth Daily.    Isidro Garza MD   digoxin (LANOXIN) 125 MCG tablet Take 125 mcg by mouth Daily.    Isidro Garza MD   doxepin (SINEquan) 25 MG capsule Take 50 mg by mouth At Night As Needed for Sleep.    Isidro Garza MD   FLUoxetine (PROzac) 20 MG capsule Take 40 mg by mouth Daily.    Isidro Garza MD   Ivabradine HCl (CORLANOR PO) Take 5 mg by mouth 2 (Two) Times a Day.    Isidro Garza MD   levothyroxine (SYNTHROID, LEVOTHROID) 150 MCG tablet Take 150 mcg by mouth Daily.    Isidro Garza MD   nitroglycerin (NITROSTAT) 0.4 MG SL tablet Place 0.4 mg under the tongue Every 5 (Five) Minutes As Needed for Chest Pain. Take  "no more than 3 doses in 15 minutes.    ProviderIsidro MD   potassium chloride (MICRO-K) 10 MEQ CR capsule Take 20 mEq by mouth 2 (Two) Times a Day.    Isidro Garza MD   sacubitril-valsartan (ENTRESTO) 24-26 MG tablet Take 1 tablet by mouth Daily.    Isidro Garza MD   torsemide (DEMADEX) 20 MG tablet Take 1 tablet by mouth Daily. Patient states she takes 20mg po once every 3 days or when noticeable swelling. 18   Jorge Yeboah MD        Social History:   Social History     Tobacco Use    Smoking status: Former     Current packs/day: 0.00     Average packs/day: 2.5 packs/day for 45.0 years (112.5 ttl pk-yrs)     Types: Cigarettes     Start date:      Quit date:      Years since quittin.3    Smokeless tobacco: Never   Substance Use Topics    Alcohol use: Yes     Alcohol/week: 1.0 standard drink of alcohol     Types: 1 Glasses of wine per week     Comment: rarely    Drug use: No     Comment: used cannabis oil as bronchodilator for a few days a couple weeks ago, but nothing since.         Review of Systems:  Review of Systems   Neurological:  Positive for headaches.        Physical Exam:  /60 (BP Location: Left arm, Patient Position: Lying)   Pulse 60   Temp 98.2 °F (36.8 °C) (Oral)   Resp 16   Ht 152.4 cm (60\")   Wt 87.9 kg (193 lb 12.6 oz)   SpO2 95%   BMI 37.85 kg/m²         Physical Exam  Vitals and nursing note reviewed.   Constitutional:       Appearance: Normal appearance.   HENT:      Head: Normocephalic.      Comments: Patient with contusion to the right side of the forehead  Eyes:      General: No scleral icterus.  Neck:      Comments: Patient in a c-collar  Cardiovascular:      Rate and Rhythm: Normal rate.   Pulmonary:      Effort: Pulmonary effort is normal.   Abdominal:      General: There is no distension.   Musculoskeletal:         General: Normal range of motion.      Cervical back: Normal range of motion.   Skin:     Findings: No rash. "   Neurological:      General: No focal deficit present.      Mental Status: She is alert and oriented to person, place, and time.      Cranial Nerves: No cranial nerve deficit.      Motor: No weakness.                      Procedures:  Procedures      Medical Decision Making:      Comorbidities that affect care:    Congestive Heart Failure, COPD, Diabetes, Hypertension    External Notes reviewed:    Reviewed ER note from 4/23/2024      The following orders were placed and all results were independently analyzed by me:  Orders Placed This Encounter   Procedures    CT Head Without Contrast       Medications Given in the Emergency Department:  Medications - No data to display     ED Course:    The patient was initially evaluated in the triage area where orders were placed. The patient was later dispositioned by Dilip Almonte MD.      The patient was advised to stay for completion of workup which includes but is not limited to communication of labs and radiological results, reassessment and plan. The patient was advised that leaving prior to disposition by a provider could result in critical findings that are not communicated to the patient.     ED Course as of 05/03/24 1414   Fri May 03, 2024   1209 PROVIDER IN TRIAGE  Patient was evaluated by Dash velazco PA-C. Orders were placed and awaiting final results and disposition.   [MV]      ED Course User Index  [MV] Dash Gastelum PA       Labs:    Lab Results (last 24 hours)       ** No results found for the last 24 hours. **             Imaging:    CT Head Without Contrast    Result Date: 5/3/2024  CT HEAD WO CONTRAST-  Date of Exam: 5/3/2024 12:16 PM  Indication: headache, recent accident.  Comparison: None available.  Technique: Axial CT images were obtained of the head without contrast administration.  Reconstructed coronal and sagittal images were also obtained. Automated exposure control and iterative construction methods were used.   Findings: There is a 3  cm organizing right frontal scalp hematoma. There is no skull fracture. Intracranially, there is no hemorrhage, edema, or mass effect. There are chronic small vessel ischemic changes in the supratentorial white matter and basal ganglia. There is no blood in the visualized paranasal sinuses/middle ear cavities      Impression: No acute intracranial process    Electronically Signed By-Lester Rosales On:5/3/2024 12:27 PM         Differential Diagnosis and Discussion:      Headache: Differential diagnosis includes but is not limited to migraine, cluster headache, hypertension, tumor, subarachnoid bleeding, pseudotumor cerebri, temporal arteritis, infections, tension headache, and TMJ syndrome.    CT scan radiology impression was interpreted by me.    MDM     Patient is a 74-year-old female who presents with complaints of headache as well as some facial pain.  Had a fall down steps roughly 10 days ago.  Had a C2 fracture which she is currently in a c-collar for.  Was seen and evaluated at [facility] for this.  She has continued to have headache.  On exam she is well-appearing with no acute findings.  Has healing contusion to the face.  No other focal deficits noted.  Likely has a concussion.  Have the patient follow-up as an outpatient.          Patient Care Considerations:        Consultants/Shared Management Plan:    None    Social Determinants of Health:    Patient is a nursing home/assisted living resident and has reliable access to care.      Disposition and Care Coordination:    Discharged: The patient is suitable and stable for discharge with no need for consideration of admission.    I have explained the patient´s condition, diagnoses and treatment plan based on the information available to me at this time. I have answered questions and addressed any concerns. The patient has a good  understanding of the patient´s diagnosis, condition, and treatment plan as can be expected at this point. The vital signs have been  stable. The patient´s condition is stable and appropriate for discharge from the emergency department.      The patient will pursue further outpatient evaluation with the primary care physician or other designated or consulting physician as outlined in the discharge instructions. They are agreeable to this plan of care and follow-up instructions have been explained in detail. The patient has received these instructions in written format and have expressed an understanding of the discharge instructions. The patient is aware that any significant change in condition or worsening of symptoms should prompt an immediate return to this or the closest emergency department or call to 911.      Final diagnoses:   Concussion with unknown loss of consciousness status, initial encounter        ED Disposition       ED Disposition   Discharge    Condition   Stable    Comment   --               This medical record created using voice recognition software.             Dilip Almonte MD  05/03/24 1870

## 2024-05-05 ENCOUNTER — HOSPITAL ENCOUNTER (EMERGENCY)
Facility: HOSPITAL | Age: 74
Discharge: HOME | End: 2024-05-05
Attending: EMERGENCY MEDICINE
Payer: MEDICARE

## 2024-05-05 ENCOUNTER — APPOINTMENT (OUTPATIENT)
Dept: CT IMAGING | Facility: HOSPITAL | Age: 74
End: 2024-05-05
Payer: MEDICARE

## 2024-05-05 VITALS
DIASTOLIC BLOOD PRESSURE: 56 MMHG | SYSTOLIC BLOOD PRESSURE: 124 MMHG | RESPIRATION RATE: 18 BRPM | BODY MASS INDEX: 39.44 KG/M2 | TEMPERATURE: 97 F | HEART RATE: 60 BPM | OXYGEN SATURATION: 93 % | WEIGHT: 201.94 LBS

## 2024-05-05 DIAGNOSIS — R51.9 ACUTE NONINTRACTABLE HEADACHE, UNSPECIFIED HEADACHE TYPE: Primary | ICD-10-CM

## 2024-05-05 DIAGNOSIS — S09.90XD CLOSED HEAD INJURY, SUBSEQUENT ENCOUNTER: ICD-10-CM

## 2024-05-05 DIAGNOSIS — H53.8 BLURRY VISION, RIGHT EYE: ICD-10-CM

## 2024-05-05 PROCEDURE — 70450 CT HEAD/BRAIN W/O DYE: CPT

## 2024-05-05 PROCEDURE — 99284 EMERGENCY DEPT VISIT MOD MDM: CPT

## 2024-05-05 NOTE — ED PROVIDER NOTES
"Time: 7:08 PM EDT  Date of encounter:  5/5/2024  Independent Historian/Clinical History and Information was obtained by:   Patient    History is limited by: N/A    Chief Complaint: Headache, blurry vision      History of Present Illness:  Patient is a 74 y.o. year old female who presents to the emergency department for evaluation of headache, blurry vision in the right eye and ringing of the left ear that started about 2 hours ago.  Patient reports that she fell down 9 flights of stairs on 4/23/2024.  She was evaluated at [facility] and admitted to a skilled nursing facility, since then transferred to [place] for rehab.  Patient reports that she has a C2 fracture which is why she is wearing a c-collar.  Patient denies nausea/vomiting, dizziness.  Patient denies chest pain or shortness of breath.  Patient denies weakness or numbness.    HPI    Patient Care Team  Primary Care Provider: Dash Santos MD    Past Medical History:     Allergies   Allergen Reactions    Levofloxacin Rash    Ace Inhibitors Angioedema    Coreg [Carvedilol] Other (See Comments)     Numbness in fingers, palms, toes    Fentanyl GI Intolerance    Iodine Angioedema    Metoprolol Swelling     \"Tongue Swelling and Rash\"    Spironolactone Other (See Comments)     Weakness    Statins Other (See Comments)     Weakness, frozen shoulders    Aleve [Naproxen Sodium] Rash    Amoxicillin Rash    Latex Rash    Tylenol [Acetaminophen] Rash     Past Medical History:   Diagnosis Date    Arthritis     Atrial fibrillation     BBB (bundle branch block)     CHF (congestive heart failure)     COPD (chronic obstructive pulmonary disease)     Diabetes mellitus     Disease of thyroid gland     GERD (gastroesophageal reflux disease)     Hyperlipidemia     Hypertension     IBS (irritable bowel syndrome)     Obesity      Past Surgical History:   Procedure Laterality Date    BILATERAL OOPHORECTOMY      CARDIAC ELECTROPHYSIOLOGY PROCEDURE N/A 10/5/2017    Procedure: " Implant ICD - bi ventricular;  Surgeon: Yash Watts MD;  Location:  [facility];  Service:     CHOLECYSTECTOMY      HYSTERECTOMY      JOINT REPLACEMENT      bilateral knees     Family History   Problem Relation Age of Onset    Heart disease Mother     Cancer Mother     Hypertension Mother     Heart disease Father     Hypertension Father     Aortic aneurysm Sister        Home Medications:  Prior to Admission medications    Medication Sig Start Date End Date Taking? Authorizing Provider   Amino Acids (L-CARNITINE PO) Take 400 mg by mouth Daily.    Isidro Garza MD   amiodarone (PACERONE) 200 MG tablet Take 1 tablet by mouth Every 12 (Twelve) Hours. 7/4/18   Jorge Yeboah MD   apixaban (ELIQUIS) 5 MG tablet tablet Take 5 mg by mouth Every 12 (Twelve) Hours. Pt unsure of dose    Isidro Garza MD   aspirin 325 MG tablet Take 325 mg by mouth Daily.    Isidro Garza MD   cetirizine (zyrTEC) 10 MG tablet Take 10 mg by mouth Daily.    Isidro Garza MD   Coenzyme Q10 (CO Q 10 PO) Take  by mouth Daily.    Isidro Garza MD   digoxin (LANOXIN) 125 MCG tablet Take 125 mcg by mouth Daily.    Isidro Garza MD   doxepin (SINEquan) 25 MG capsule Take 50 mg by mouth At Night As Needed for Sleep.    Isidro Garza MD   FLUoxetine (PROzac) 20 MG capsule Take 40 mg by mouth Daily.    Isidro Garza MD   Ivabradine HCl (CORLANOR PO) Take 5 mg by mouth 2 (Two) Times a Day.    Isidro Garza MD   levothyroxine (SYNTHROID, LEVOTHROID) 150 MCG tablet Take 150 mcg by mouth Daily.    Isidro Garza MD   nitroglycerin (NITROSTAT) 0.4 MG SL tablet Place 0.4 mg under the tongue Every 5 (Five) Minutes As Needed for Chest Pain. Take no more than 3 doses in 15 minutes.    Isidro Garza MD   potassium chloride (MICRO-K) 10 MEQ CR capsule Take 20 mEq by mouth 2 (Two) Times a Day.    Isidro Garza MD   sacubitril-valsartan  (ENTRESTO) 24-26 MG tablet Take 1 tablet by mouth Daily.    Provider, MD Isidro   torsemide (DEMADEX) 20 MG tablet Take 1 tablet by mouth Daily. Patient states she takes 20mg po once every 3 days or when noticeable swelling. 18   Jorge Yeboah MD        Social History:   Social History     Tobacco Use    Smoking status: Former     Current packs/day: 0.00     Average packs/day: 2.5 packs/day for 45.0 years (112.5 ttl pk-yrs)     Types: Cigarettes     Start date:      Quit date:      Years since quittin.3    Smokeless tobacco: Never   Substance Use Topics    Alcohol use: Yes     Alcohol/week: 1.0 standard drink of alcohol     Types: 1 Glasses of wine per week     Comment: rarely    Drug use: No     Comment: used cannabis oil as bronchodilator for a few days a couple weeks ago, but nothing since.         Review of Systems:  Review of Systems   Constitutional:  Negative for chills and fever.   HENT:  Negative for congestion, ear pain and sore throat.         Left ear ringing   Eyes:  Positive for visual disturbance. Negative for pain.   Respiratory:  Negative for cough, chest tightness and shortness of breath.    Cardiovascular:  Negative for chest pain.   Gastrointestinal:  Negative for abdominal pain, diarrhea, nausea and vomiting.   Genitourinary:  Negative for flank pain and hematuria.   Musculoskeletal:  Negative for joint swelling.   Skin:  Positive for color change and wound. Negative for pallor.   Neurological:  Positive for headaches. Negative for seizures.   All other systems reviewed and are negative.       Physical Exam:  /56   Pulse 60   Temp 97 °F (36.1 °C)   Resp 18   Wt 91.6 kg (201 lb 15.1 oz)   SpO2 93%   BMI 39.44 kg/m²     Physical Exam  Vitals and nursing note reviewed.   Constitutional:       General: She is not in acute distress.     Appearance: Normal appearance. She is not ill-appearing or toxic-appearing.   HENT:      Head: Normocephalic.        Right  Ear: Tympanic membrane, ear canal and external ear normal.      Left Ear: Tympanic membrane, ear canal and external ear normal.      Nose: Nose normal.      Mouth/Throat:      Lips: Pink.      Mouth: Mucous membranes are moist.   Eyes:      General: No scleral icterus.     Extraocular Movements: Extraocular movements intact.      Pupils: Pupils are equal, round, and reactive to light.   Neck:      Comments: In c-collar  Cardiovascular:      Rate and Rhythm: Normal rate and regular rhythm.      Pulses:           Radial pulses are 2+ on the right side and 2+ on the left side.      Heart sounds: Normal heart sounds.   Pulmonary:      Effort: Pulmonary effort is normal. No respiratory distress.      Breath sounds: Normal breath sounds.   Abdominal:      General: Abdomen is protuberant. Bowel sounds are normal.      Palpations: Abdomen is soft.      Tenderness: There is no abdominal tenderness.   Musculoskeletal:         General: Normal range of motion.   Skin:     General: Skin is warm and dry.      Comments: Scattered areas of ecchymosis and abrasions   Neurological:      Mental Status: She is alert and oriented to person, place, and time. Mental status is at baseline.                Procedures:  Procedures      Medical Decision Making:      Comorbidities that affect care:    Atrial Fibrillation, Congestive Heart Failure, COPD, Diabetes, Hypertension, Thyroid Disease, Obesity    External Notes reviewed:    Previous ED Note: 5/3/2024, negative CT head      The following orders were placed and all results were independently analyzed by me:  Orders Placed This Encounter   Procedures    CT Head Without Contrast    Ambulatory Referral to Neurology       Medications Given in the Emergency Department:  Medications - No data to display     ED Course:    ED Course as of 05/06/24 0635   Sun May 05, 2024   2033 CT Head Without Contrast  No acute findings [CW]      ED Course User Index  [CW] Brenda Westfall APRN       Labs:    Lab  Results (last 24 hours)       ** No results found for the last 24 hours. **             Imaging:    CT Head Without Contrast    Result Date: 5/5/2024  CT HEAD WO CONTRAST-  Date of Exam: 5/5/2024 7:25 PM  Indication: 74-year-old female w/ h/o prior fall (4/23/2024), as well as another fall (today); left occipital headache; +blurry vision in right eye.  Comparison: 5/3/2024, 12:18 p.m.  Technique: Axial CT images were obtained of the head without contrast administration.  Reconstructed 2D coronal and sagittal images were also obtained. Automated exposure control and iterative construction methods were used.  FINDINGS: A routine nonenhanced head CT was performed. A very large mixed-aged hemorrhagic right frontal scalp contusion is seen without an associated acute skull fracture. This finding is roughly estimated at 2.3 cm in greatest thickness. The entire extent of the hematoma may measure as large as 8.9 x 1.7 x 9.3 cm in curvilinear anteroposterior (AP), oblique transverse, and curvilinear craniocaudal dimensions, respectively, as seen on image 40 of series 201, image 29 of series 202, image 26 of series 203, and adjacent images. A similar finding was seen previously. Fluid-fluid levels within the hematoma may suggest active extravasation. Again, no acute skull fracture is seen. There is a smaller hematoma at the level of the vertex on the right, which is roughly estimated at 4.3 x 1.3 x 6.9 cm in transverse, craniocaudal, and anterior-posterior (AP) extent, as seen on image 61 of series 201, image 62 of series 202, image 37 of series 203, adjacent images. This finding was also seen previously. Again, no acute skull fracture is identified. No acute intracranial hemorrhage is seen. No midline shift or acute intracranial herniation syndrome is suggested. There may be mild chronic small vessel ischemia/infarction. There may be involvement of the cerebellar hemispheres and brainstem as well as the bilateral basal  ganglia by small vessel disease. Mild cerebellar tonsillar ectopia is possible. Mild prominence of the extra-axial spaces and the ventricular system is noted, as before. There may be some degree of central atrophy. No acute orbital findings are seen. Degenerative changes involve the bilateral temporomandibular joints (TMJs). The imaged middle ear clefts (that is, the bilateral mastoid air cell complexes, bilateral middle ear cavities, and bilateral external auditory canals) are well aerated. Minimal age-indeterminate mucosal thickening involves the imaged paranasal sinuses.  No air-fluid interfaces are seen within the imaged paranasal sinuses.      No acute brain abnormality is seen. No acute intracranial hemorrhage. No acute infarction. No acute skull fracture. Evolving right-sided hemorrhagic scalp contusions are present, as detailed above.     Please note that portions of this note were completed with a voice recognition program.     Electronically Signed By-Alex Erazo MD On:5/5/2024 8:10 PM         Differential Diagnosis and Discussion:    Eye Pain/Blurred Vision: Differential diagnosis includes but is not limited to dacryocystitis, hordeolum, chalazion, periorbital cellulitis, cavernous sinus thrombosis, blepharitis, and glaucoma.  Headache: Differential diagnosis includes but is not limited to migraine, cluster headache, hypertension, tumor, subarachnoid bleeding, pseudotumor cerebri, temporal arteritis, infections, tension headache, and TMJ syndrome.    CT scan radiology impression was interpreted by me.    MDM  Number of Diagnoses or Management Options  Acute nonintractable headache, unspecified headache type  Blurry vision, right eye  Closed head injury, subsequent encounter  Diagnosis management comments: Patient presented with worsening headache as well as blurry vision and right eye and left ear ringing after close head injury 10 days ago.  The patient has no focal neurological deficit.  Patient was  seen here for similar complaints 2 days ago.  Symptoms had worsened since then.  No acute findings on CT scan.  Can consider ordering MRI, however the patient has a pacemaker in place and cannot be verified to be suitable for MRI imaging.  Counseled patient to follow-up with neurology to discuss outpatient MRI. I do not believe that the patient has an acute emergency medical condition requiring additional emergency management at this time. The patient is currently stable for outpatient treatment and continuation of care. Important signs and symptoms that would warrant return to the emergency department were reviewed. The patient was provided the opportunity to ask questions. All questions were addressed and the patient was discharged from the ED. The patient demonstrated understanding and agreed to plan.         Amount and/or Complexity of Data Reviewed  Tests in the radiology section of CPT®: reviewed and ordered    Risk of Complications, Morbidity, and/or Mortality  Presenting problems: moderate  Diagnostic procedures: low  Management options: minimal    Patient Progress  Patient progress: stable                 Patient Care Considerations:    MRI: I considered ordering an MRI however patient has no focal neurological deficit.  Patient also has pacemaker in place which contraindicates for emergent MRI.      Consultants/Shared Management Plan:    SHARED VISIT: I have discussed the case with my supervising physician, Dr. Murphy who states patient can be discharged with referral to neurology and recommendation for outpatient MRI. The substantive portion of the medical decision was made by the attesting physician who made or approve the management plan and will take responsibility for the patient.  Clinical findings were discussed and ultimate disposition was made in consult with supervising physician.    Social Determinants of Health:    Patient is a nursing home/assisted living resident and has reliable access to  care.      Disposition and Care Coordination:    Discharged: The patient is suitable and stable for discharge with no need for consideration of admission.        Final diagnoses:   Acute nonintractable headache, unspecified headache type   Blurry vision, right eye   Closed head injury, subsequent encounter        ED Disposition       ED Disposition   Discharge    Condition   Stable    Comment   --               This medical record created using voice recognition software.             Brenda Westfall, BEN  05/06/24 0635

## 2024-05-06 NOTE — DISCHARGE INSTRUCTIONS
There were no new findings on his CT scan today.    I placed a referral for you for neurology follow-up.  Please discuss outpatient MRI with either your PCP or neurology.    Return for worsening symptoms such as weakness, numbness, confusion, nausea/vomiting, or any new concerns.

## 2024-05-10 ENCOUNTER — TRANSCRIBE ORDERS (OUTPATIENT)
Dept: ADMINISTRATIVE | Facility: HOSPITAL | Age: 74
End: 2024-05-10
Payer: MEDICARE

## (undated) DEVICE — Device

## (undated) DEVICE — INTRO TEAR AWAY/LVD W/SD PRT 9.5F 13CM

## (undated) DEVICE — INTRO TEAR AWAY/LVD W/SD PRT 8F 13CM

## (undated) DEVICE — HI-TORQUE WHISPER ES GUIDE WIRE .014 STRAIGHTTIP 3.0 CM X 190 CM: Brand: HI-TORQUE WHISPER

## (undated) DEVICE — Device: Brand: WEBSTER

## (undated) DEVICE — LOU PACE DEFIB: Brand: MEDLINE INDUSTRIES, INC.

## (undated) DEVICE — LIMB HOLDER, WRIST/ANKLE: Brand: DEROYAL

## (undated) DEVICE — SUREFIT, DUAL DISPERSIVE ELECTRODE, CONTACT QUALITY MONITOR: Brand: SUREFIT

## (undated) DEVICE — SOL IRR NACL 0.9PCT BT 1000ML